# Patient Record
Sex: FEMALE | Race: WHITE | Employment: OTHER | URBAN - METROPOLITAN AREA
[De-identification: names, ages, dates, MRNs, and addresses within clinical notes are randomized per-mention and may not be internally consistent; named-entity substitution may affect disease eponyms.]

---

## 2024-03-13 ENCOUNTER — APPOINTMENT (OUTPATIENT)
Dept: CT IMAGING | Facility: HOSPITAL | Age: 79
End: 2024-03-13
Attending: EMERGENCY MEDICINE
Payer: MEDICARE

## 2024-03-13 ENCOUNTER — HOSPITAL ENCOUNTER (INPATIENT)
Facility: HOSPITAL | Age: 79
LOS: 7 days | Discharge: HOME | End: 2024-03-20
Attending: EMERGENCY MEDICINE | Admitting: HOSPITALIST
Payer: MEDICARE

## 2024-03-13 ENCOUNTER — APPOINTMENT (OUTPATIENT)
Dept: GENERAL RADIOLOGY | Facility: HOSPITAL | Age: 79
End: 2024-03-13
Attending: EMERGENCY MEDICINE
Payer: MEDICARE

## 2024-03-13 ENCOUNTER — APPOINTMENT (OUTPATIENT)
Dept: GENERAL RADIOLOGY | Facility: HOSPITAL | Age: 79
End: 2024-03-13
Attending: INTERNAL MEDICINE
Payer: MEDICARE

## 2024-03-13 DIAGNOSIS — I50.9 CONGESTIVE HEART FAILURE, UNSPECIFIED HF CHRONICITY, UNSPECIFIED HEART FAILURE TYPE (HCC): ICD-10-CM

## 2024-03-13 DIAGNOSIS — E87.1 HYPONATREMIA: ICD-10-CM

## 2024-03-13 DIAGNOSIS — J11.1 INFLUENZA: ICD-10-CM

## 2024-03-13 DIAGNOSIS — J44.1 COPD EXACERBATION (HCC): Primary | ICD-10-CM

## 2024-03-13 DIAGNOSIS — R09.02 HYPOXIA: ICD-10-CM

## 2024-03-13 DIAGNOSIS — E66.01 MORBID OBESITY (HCC): ICD-10-CM

## 2024-03-13 PROBLEM — E87.3 METABOLIC ALKALOSIS: Status: ACTIVE | Noted: 2024-03-13

## 2024-03-13 LAB
ALBUMIN SERPL-MCNC: 3.7 G/DL (ref 3.4–5)
ALBUMIN/GLOB SERPL: 1.1 {RATIO} (ref 1–2)
ALP LIVER SERPL-CCNC: 111 U/L
ALT SERPL-CCNC: 29 U/L
ANION GAP SERPL CALC-SCNC: 6 MMOL/L (ref 0–18)
ARTERIAL PATENCY WRIST A: POSITIVE
AST SERPL-CCNC: 16 U/L (ref 15–37)
BASE EXCESS BLDA CALC-SCNC: -0.6 MMOL/L (ref ?–2)
BASE EXCESS BLDA CALC-SCNC: -2.5 MMOL/L (ref ?–2)
BASE EXCESS BLDA CALC-SCNC: -4.5 MMOL/L (ref ?–2)
BASOPHILS # BLD AUTO: 0.08 X10(3) UL (ref 0–0.2)
BASOPHILS NFR BLD AUTO: 0.9 %
BILIRUB SERPL-MCNC: 0.6 MG/DL (ref 0.1–2)
BODY TEMPERATURE: 98.6 F
BUN BLD-MCNC: 13 MG/DL (ref 9–23)
CA-I BLD-SCNC: 1.16 MMOL/L (ref 0.95–1.32)
CA-I BLD-SCNC: 1.19 MMOL/L (ref 0.95–1.32)
CA-I BLD-SCNC: 1.22 MMOL/L (ref 0.95–1.32)
CALCIUM BLD-MCNC: 9.7 MG/DL (ref 8.5–10.1)
CHLORIDE SERPL-SCNC: 102 MMOL/L (ref 98–112)
CO2 SERPL-SCNC: 24 MMOL/L (ref 21–32)
COHGB MFR BLD: 1 % SAT (ref 0–3)
COHGB MFR BLD: 1.2 % SAT (ref 0–3)
COHGB MFR BLD: 1.2 % SAT (ref 0–3)
CREAT BLD-MCNC: 0.88 MG/DL
D DIMER PPP FEU-MCNC: 0.86 UG/ML FEU (ref ?–0.77)
EGFRCR SERPLBLD CKD-EPI 2021: 68 ML/MIN/1.73M2 (ref 60–?)
EOSINOPHIL # BLD AUTO: 0.21 X10(3) UL (ref 0–0.7)
EOSINOPHIL NFR BLD AUTO: 2.3 %
ERYTHROCYTE [DISTWIDTH] IN BLOOD BY AUTOMATED COUNT: 15.6 %
EXPIRATORY PRESSURE: 6 CM H2O
FIO2: 45 %
FIO2: 80 %
FLUAV + FLUBV RNA SPEC NAA+PROBE: NEGATIVE
FLUAV + FLUBV RNA SPEC NAA+PROBE: POSITIVE
GLOBULIN PLAS-MCNC: 3.4 G/DL (ref 2.8–4.4)
GLUCOSE BLD-MCNC: 130 MG/DL (ref 70–99)
GLUCOSE BLD-MCNC: 156 MG/DL (ref 70–99)
HCO3 BLDA-SCNC: 21.4 MEQ/L (ref 21–27)
HCO3 BLDA-SCNC: 23 MEQ/L (ref 21–27)
HCO3 BLDA-SCNC: 24.5 MEQ/L (ref 21–27)
HCT VFR BLD AUTO: 42.2 %
HGB BLD-MCNC: 13.4 G/DL
HGB BLD-MCNC: 13.6 G/DL
HGB BLD-MCNC: 13.7 G/DL
HGB BLD-MCNC: 13.8 G/DL
IMM GRANULOCYTES # BLD AUTO: 0.07 X10(3) UL (ref 0–1)
IMM GRANULOCYTES NFR BLD: 0.8 %
INSP PRESSURE: 14 CM H2O
L/M: 12 L/MIN
LACTATE BLD-SCNC: 1.7 MMOL/L (ref 0.5–2)
LACTATE BLD-SCNC: 2.5 MMOL/L (ref 0.5–2)
LACTATE BLD-SCNC: 2.8 MMOL/L (ref 0.5–2)
LACTATE SERPL-SCNC: 1.8 MMOL/L (ref 0.4–2)
LYMPHOCYTES # BLD AUTO: 0.54 X10(3) UL (ref 1–4)
LYMPHOCYTES NFR BLD AUTO: 5.8 %
MCH RBC QN AUTO: 32.1 PG (ref 26–34)
MCHC RBC AUTO-ENTMCNC: 31.8 G/DL (ref 31–37)
MCV RBC AUTO: 101 FL
METHGB MFR BLD: 0 % SAT (ref 0.4–1.5)
METHGB MFR BLD: 0.3 % SAT (ref 0.4–1.5)
METHGB MFR BLD: 0.4 % SAT (ref 0.4–1.5)
MONOCYTES # BLD AUTO: 0.67 X10(3) UL (ref 0.1–1)
MONOCYTES NFR BLD AUTO: 7.2 %
NEUTROPHILS # BLD AUTO: 7.7 X10 (3) UL (ref 1.5–7.7)
NEUTROPHILS # BLD AUTO: 7.7 X10(3) UL (ref 1.5–7.7)
NEUTROPHILS NFR BLD AUTO: 83 %
NT-PROBNP SERPL-MCNC: 727 PG/ML (ref ?–450)
OSMOLALITY SERPL CALC.SUM OF ELEC: 276 MOSM/KG (ref 275–295)
OXYHGB MFR BLDA: 97 % (ref 92–100)
OXYHGB MFR BLDA: 97.7 % (ref 92–100)
OXYHGB MFR BLDA: 97.9 % (ref 92–100)
P/F RATIO: 209 MMHG
PCO2 BLDA: 35 MM HG (ref 35–45)
PCO2 BLDA: 35 MM HG (ref 35–45)
PCO2 BLDA: 43 MM HG (ref 35–45)
PEEP: 5 CM H2O
PH BLDA: 7.31 [PH] (ref 7.35–7.45)
PH BLDA: 7.4 [PH] (ref 7.35–7.45)
PH BLDA: 7.43 [PH] (ref 7.35–7.45)
PLATELET # BLD AUTO: 259 10(3)UL (ref 150–450)
PO2 BLDA: 117 MM HG (ref 80–100)
PO2 BLDA: 140 MM HG (ref 80–100)
PO2 BLDA: 94 MM HG (ref 80–100)
POTASSIUM BLD-SCNC: 4 MMOL/L (ref 3.6–5.1)
POTASSIUM BLD-SCNC: 4.3 MMOL/L (ref 3.6–5.1)
POTASSIUM BLD-SCNC: 4.4 MMOL/L (ref 3.6–5.1)
POTASSIUM SERPL-SCNC: 3.9 MMOL/L (ref 3.5–5.1)
PROT SERPL-MCNC: 7.1 G/DL (ref 6.4–8.2)
RBC # BLD AUTO: 4.18 X10(6)UL
RSV RNA SPEC NAA+PROBE: NEGATIVE
SARS-COV-2 RNA RESP QL NAA+PROBE: NOT DETECTED
SODIUM BLD-SCNC: 128 MMOL/L (ref 135–145)
SODIUM BLD-SCNC: 129 MMOL/L (ref 135–145)
SODIUM BLD-SCNC: 130 MMOL/L (ref 135–145)
SODIUM SERPL-SCNC: 132 MMOL/L (ref 136–145)
TIDAL VOLUME: 450 ML
TROPONIN I SERPL HS-MCNC: 10 NG/L
VENT RATE: 16 /MIN
VENT RATE: 16 /MIN
WBC # BLD AUTO: 9.3 X10(3) UL (ref 4–11)

## 2024-03-13 PROCEDURE — 3E033XZ INTRODUCTION OF VASOPRESSOR INTO PERIPHERAL VEIN, PERCUTANEOUS APPROACH: ICD-10-PCS | Performed by: EMERGENCY MEDICINE

## 2024-03-13 PROCEDURE — 71045 X-RAY EXAM CHEST 1 VIEW: CPT | Performed by: INTERNAL MEDICINE

## 2024-03-13 PROCEDURE — 71275 CT ANGIOGRAPHY CHEST: CPT | Performed by: EMERGENCY MEDICINE

## 2024-03-13 PROCEDURE — 71045 X-RAY EXAM CHEST 1 VIEW: CPT | Performed by: EMERGENCY MEDICINE

## 2024-03-13 PROCEDURE — 99223 1ST HOSP IP/OBS HIGH 75: CPT | Performed by: HOSPITALIST

## 2024-03-13 PROCEDURE — 5A09357 ASSISTANCE WITH RESPIRATORY VENTILATION, LESS THAN 24 CONSECUTIVE HOURS, CONTINUOUS POSITIVE AIRWAY PRESSURE: ICD-10-PCS | Performed by: EMERGENCY MEDICINE

## 2024-03-13 PROCEDURE — 5A1935Z RESPIRATORY VENTILATION, LESS THAN 24 CONSECUTIVE HOURS: ICD-10-PCS | Performed by: EMERGENCY MEDICINE

## 2024-03-13 PROCEDURE — 0BH17EZ INSERTION OF ENDOTRACHEAL AIRWAY INTO TRACHEA, VIA NATURAL OR ARTIFICIAL OPENING: ICD-10-PCS | Performed by: EMERGENCY MEDICINE

## 2024-03-13 PROCEDURE — 70450 CT HEAD/BRAIN W/O DYE: CPT | Performed by: EMERGENCY MEDICINE

## 2024-03-13 RX ORDER — ACETAMINOPHEN 325 MG/1
650 TABLET ORAL EVERY 4 HOURS PRN
Status: DISCONTINUED | OUTPATIENT
Start: 2024-03-13 | End: 2024-03-14

## 2024-03-13 RX ORDER — COLCHICINE 0.6 MG/1
0.6 TABLET ORAL DAILY
COMMUNITY
End: 2024-03-20

## 2024-03-13 RX ORDER — LEVOTHYROXINE SODIUM 88 UG/1
88 TABLET ORAL
Status: DISCONTINUED | OUTPATIENT
Start: 2024-03-14 | End: 2024-03-20

## 2024-03-13 RX ORDER — METHYLPREDNISOLONE SODIUM SUCCINATE 125 MG/2ML
60 INJECTION, POWDER, LYOPHILIZED, FOR SOLUTION INTRAMUSCULAR; INTRAVENOUS EVERY 8 HOURS
Status: DISCONTINUED | OUTPATIENT
Start: 2024-03-13 | End: 2024-03-13

## 2024-03-13 RX ORDER — MELATONIN
325
COMMUNITY
End: 2024-03-20

## 2024-03-13 RX ORDER — ACETAMINOPHEN 10 MG/ML
1000 INJECTION, SOLUTION INTRAVENOUS EVERY 6 HOURS PRN
Status: DISCONTINUED | OUTPATIENT
Start: 2024-03-13 | End: 2024-03-14

## 2024-03-13 RX ORDER — ETOMIDATE 2 MG/ML
INJECTION INTRAVENOUS
Status: COMPLETED | OUTPATIENT
Start: 2024-03-13 | End: 2024-03-13

## 2024-03-13 RX ORDER — ONDANSETRON 2 MG/ML
4 INJECTION INTRAMUSCULAR; INTRAVENOUS ONCE
Status: COMPLETED | OUTPATIENT
Start: 2024-03-13 | End: 2024-03-13

## 2024-03-13 RX ORDER — LEVETIRACETAM 500 MG/1
500 TABLET, FILM COATED, EXTENDED RELEASE ORAL DAILY
COMMUNITY
End: 2024-03-20

## 2024-03-13 RX ORDER — VALSARTAN 160 MG/1
160 TABLET ORAL DAILY
COMMUNITY
End: 2024-03-20

## 2024-03-13 RX ORDER — FUROSEMIDE 10 MG/ML
40 INJECTION INTRAMUSCULAR; INTRAVENOUS ONCE
Status: COMPLETED | OUTPATIENT
Start: 2024-03-13 | End: 2024-03-13

## 2024-03-13 RX ORDER — LEFLUNOMIDE 10 MG/1
10 TABLET ORAL DAILY
COMMUNITY
End: 2024-03-20

## 2024-03-13 RX ORDER — BUPROPION HYDROCHLORIDE 300 MG/1
300 TABLET ORAL DAILY
COMMUNITY
End: 2024-03-20

## 2024-03-13 RX ORDER — ETOMIDATE 2 MG/ML
0.3 INJECTION INTRAVENOUS ONCE
Status: DISCONTINUED | OUTPATIENT
Start: 2024-03-13 | End: 2024-03-14 | Stop reason: ALTCHOICE

## 2024-03-13 RX ORDER — METOPROLOL SUCCINATE 200 MG/1
200 TABLET, EXTENDED RELEASE ORAL DAILY
COMMUNITY
End: 2024-03-20

## 2024-03-13 RX ORDER — ACETAMINOPHEN 650 MG/1
650 SUPPOSITORY RECTAL ONCE
Status: COMPLETED | OUTPATIENT
Start: 2024-03-13 | End: 2024-03-13

## 2024-03-13 RX ORDER — DIAZEPAM 5 MG/ML
2.5 INJECTION, SOLUTION INTRAMUSCULAR; INTRAVENOUS ONCE
Status: COMPLETED | OUTPATIENT
Start: 2024-03-13 | End: 2024-03-13

## 2024-03-13 RX ORDER — METHYLPREDNISOLONE SODIUM SUCCINATE 125 MG/2ML
60 INJECTION, POWDER, LYOPHILIZED, FOR SOLUTION INTRAMUSCULAR; INTRAVENOUS EVERY 8 HOURS
Status: DISCONTINUED | OUTPATIENT
Start: 2024-03-13 | End: 2024-03-15

## 2024-03-13 RX ORDER — CHLORHEXIDINE GLUCONATE ORAL RINSE 1.2 MG/ML
15 SOLUTION DENTAL
Status: DISCONTINUED | OUTPATIENT
Start: 2024-03-13 | End: 2024-03-14

## 2024-03-13 RX ORDER — MIRABEGRON 50 MG/1
50 TABLET, FILM COATED, EXTENDED RELEASE ORAL DAILY
COMMUNITY
End: 2024-03-20

## 2024-03-13 RX ORDER — FESOTERODINE FUMARATE 4 MG/1
4 TABLET, EXTENDED RELEASE ORAL DAILY
COMMUNITY
End: 2024-03-20

## 2024-03-13 RX ORDER — CLONAZEPAM 0.5 MG/1
0.5 TABLET ORAL DAILY
COMMUNITY
End: 2024-03-20

## 2024-03-13 RX ORDER — LEVOTHYROXINE SODIUM 88 UG/1
88 TABLET ORAL
COMMUNITY

## 2024-03-13 RX ORDER — PANTOPRAZOLE SODIUM 40 MG/1
40 TABLET, DELAYED RELEASE ORAL
COMMUNITY
End: 2024-03-20

## 2024-03-13 RX ORDER — METHYLPREDNISOLONE SODIUM SUCCINATE 125 MG/2ML
80 INJECTION, POWDER, LYOPHILIZED, FOR SOLUTION INTRAMUSCULAR; INTRAVENOUS ONCE
Status: COMPLETED | OUTPATIENT
Start: 2024-03-13 | End: 2024-03-13

## 2024-03-13 RX ORDER — DEXMEDETOMIDINE HYDROCHLORIDE 4 UG/ML
INJECTION, SOLUTION INTRAVENOUS CONTINUOUS
Status: DISCONTINUED | OUTPATIENT
Start: 2024-03-13 | End: 2024-03-14

## 2024-03-13 RX ORDER — HYDRALAZINE HYDROCHLORIDE 20 MG/ML
20 INJECTION INTRAMUSCULAR; INTRAVENOUS ONCE
Status: COMPLETED | OUTPATIENT
Start: 2024-03-13 | End: 2024-03-13

## 2024-03-13 RX ORDER — POLYETHYLENE GLYCOL 3350 17 G/17G
17 POWDER, FOR SOLUTION ORAL DAILY PRN
Status: DISCONTINUED | OUTPATIENT
Start: 2024-03-13 | End: 2024-03-14

## 2024-03-13 RX ORDER — HYDROXYCHLOROQUINE SULFATE 200 MG/1
200 TABLET, FILM COATED ORAL 2 TIMES DAILY
COMMUNITY
End: 2024-03-20

## 2024-03-13 RX ORDER — ENOXAPARIN SODIUM 100 MG/ML
40 INJECTION SUBCUTANEOUS DAILY
Status: DISCONTINUED | OUTPATIENT
Start: 2024-03-13 | End: 2024-03-16

## 2024-03-13 RX ORDER — FAMOTIDINE 10 MG/ML
20 INJECTION, SOLUTION INTRAVENOUS 2 TIMES DAILY
Status: DISCONTINUED | OUTPATIENT
Start: 2024-03-13 | End: 2024-03-13 | Stop reason: SDUPTHER

## 2024-03-13 RX ORDER — ENEMA 19; 7 G/133ML; G/133ML
1 ENEMA RECTAL ONCE AS NEEDED
Status: DISCONTINUED | OUTPATIENT
Start: 2024-03-13 | End: 2024-03-14

## 2024-03-13 RX ORDER — LEVETIRACETAM 100 MG/ML
500 SOLUTION ORAL 2 TIMES DAILY
Status: DISCONTINUED | OUTPATIENT
Start: 2024-03-13 | End: 2024-03-14

## 2024-03-13 RX ORDER — BISACODYL 10 MG
10 SUPPOSITORY, RECTAL RECTAL
Status: DISCONTINUED | OUTPATIENT
Start: 2024-03-13 | End: 2024-03-14

## 2024-03-13 RX ORDER — ACETAMINOPHEN 650 MG/1
650 SUPPOSITORY RECTAL EVERY 4 HOURS PRN
Status: DISCONTINUED | OUTPATIENT
Start: 2024-03-13 | End: 2024-03-14

## 2024-03-13 RX ORDER — ACETAMINOPHEN 160 MG/5ML
650 SOLUTION ORAL EVERY 4 HOURS PRN
Status: DISCONTINUED | OUTPATIENT
Start: 2024-03-13 | End: 2024-03-14

## 2024-03-13 RX ORDER — ZAFIRLUKAST 20 MG/1
20 TABLET, FILM COATED ORAL DAILY
COMMUNITY

## 2024-03-13 RX ORDER — METHOTREXATE 2.5 MG/1
2.5 TABLET ORAL
COMMUNITY
End: 2024-03-20

## 2024-03-13 RX ORDER — PANTOPRAZOLE SODIUM 40 MG/1
40 TABLET, DELAYED RELEASE ORAL
Status: DISCONTINUED | OUTPATIENT
Start: 2024-03-13 | End: 2024-03-20

## 2024-03-13 RX ORDER — PRAMIPEXOLE DIHYDROCHLORIDE 0.12 MG/1
0.12 TABLET ORAL
COMMUNITY

## 2024-03-13 RX ORDER — LEVETIRACETAM 500 MG/1
500 TABLET ORAL 2 TIMES DAILY
Status: DISCONTINUED | OUTPATIENT
Start: 2024-03-13 | End: 2024-03-13 | Stop reason: SDUPTHER

## 2024-03-13 RX ORDER — KETOROLAC TROMETHAMINE 15 MG/ML
15 INJECTION, SOLUTION INTRAMUSCULAR; INTRAVENOUS EVERY 6 HOURS PRN
Status: DISCONTINUED | OUTPATIENT
Start: 2024-03-13 | End: 2024-03-14

## 2024-03-13 RX ORDER — OSELTAMIVIR PHOSPHATE 30 MG/1
30 CAPSULE ORAL ONCE
Status: DISCONTINUED | OUTPATIENT
Start: 2024-03-13 | End: 2024-03-14

## 2024-03-13 RX ORDER — OSELTAMIVIR PHOSPHATE 6 MG/ML
30 FOR SUSPENSION ORAL EVERY 12 HOURS SCHEDULED
Qty: 50 ML | Refills: 0 | Status: COMPLETED | OUTPATIENT
Start: 2024-03-13 | End: 2024-03-18

## 2024-03-13 RX ORDER — METOCLOPRAMIDE HYDROCHLORIDE 5 MG/ML
10 INJECTION INTRAMUSCULAR; INTRAVENOUS EVERY 8 HOURS PRN
Status: DISCONTINUED | OUTPATIENT
Start: 2024-03-13 | End: 2024-03-16

## 2024-03-13 RX ORDER — SENNOSIDES 8.8 MG/5ML
10 LIQUID ORAL NIGHTLY PRN
Status: DISCONTINUED | OUTPATIENT
Start: 2024-03-13 | End: 2024-03-14

## 2024-03-13 RX ORDER — DEXMEDETOMIDINE HYDROCHLORIDE 4 UG/ML
INJECTION, SOLUTION INTRAVENOUS
Status: COMPLETED
Start: 2024-03-13 | End: 2024-03-13

## 2024-03-13 RX ORDER — IPRATROPIUM BROMIDE AND ALBUTEROL SULFATE 2.5; .5 MG/3ML; MG/3ML
3 SOLUTION RESPIRATORY (INHALATION) ONCE
Status: COMPLETED | OUTPATIENT
Start: 2024-03-13 | End: 2024-03-13

## 2024-03-13 RX ORDER — PANTOPRAZOLE SODIUM 40 MG/1
40 TABLET, DELAYED RELEASE ORAL
Status: DISCONTINUED | OUTPATIENT
Start: 2024-03-14 | End: 2024-03-13 | Stop reason: SDUPTHER

## 2024-03-13 RX ORDER — ONDANSETRON 2 MG/ML
4 INJECTION INTRAMUSCULAR; INTRAVENOUS EVERY 6 HOURS PRN
Status: DISCONTINUED | OUTPATIENT
Start: 2024-03-13 | End: 2024-03-20

## 2024-03-13 NOTE — ED QUICK NOTES
In CT pt vomitted in Bipap for the second time; pt suctioned at CT and when returned to room; pt is awake w/speech but gag reflex is absent and pt has increased lethargy; MD updated, RT at bedside.

## 2024-03-13 NOTE — SPIRITUAL CARE NOTE
Spiritual Care Visit Note    Patient Name: Ella Berry Date of Spiritual Care Visit: 24   : 1945 Primary Dx: COPD exacerbation (HCC)       Referred By: Referral From: Nurse    Spiritual Care Taxonomy:    Intended Effects: Build relationship of care and support    Methods: Collaborate with care team member;Encourage story-telling    Interventions: Acknowledge response to difficult experience;Active listening;Ask guided questions;Silent prayer    Visit Type/Summary:     - Spiritual Care: Responded to a request via the on call phone Consulted with RN prior to visit. Offered empathic listening and emotional support. Provided information regarding how to contact Spiritual Care and left a Spiritual Care information card.    Spiritual Care support can be requested via an Epic consult. For urgent/immediate needs, please contact the On Call  at: [place]: ext [phone]    Rev. Flakita Singh MDiv

## 2024-03-13 NOTE — ED QUICK NOTES
Report received, care assumed. RN notified the pt will be intubated. Charge nurse notified. RSI and advanced airway cart to the bedside. Pharmacy ED and respiratory ED at the bedside

## 2024-03-13 NOTE — CONSULTS
[facility]    Ella Berry Patient Status:  Emergency    1945 MRN [id number]   Location [facility] EMERGENCY DEPARTMENT Attending Kaylah Saucedo MD   Hosp Day # 0 PCP None Pcp     Date of Admission: 3/13/2024  Admission Diagnosis: No admission diagnoses are documented for this encounter.     History of Present Illness: 80 y/o with h/o COPD, obesity, HTN and previous resp failure, resides in [place] and was traveling through [place], developed worsening resp distress over the last 24hrs.  Pt worsened despite use of home neb and came to ED where she was found to be in resp distress.  Pt placed on BIPAP for increased WOB.  Went to CTA to r/o PE bc of elevated ddimer and while there vomited, worsening her condition. Pt also noted to be + for influenza.  Upon my arrival, pt is in distress with RR in 40's and some confusion.  D/w  at bedside and agree that at this point, pt needs intubation for full support.  - prior to admission, no n/v/d/dysuria/hemoptysis.     Past Medical History:   Diagnosis Date    COPD (chronic obstructive pulmonary disease) (HCC)     Essential hypertension     Left bundle branch block     Metabolic encephalopathy     Pneumonia       History reviewed. No pertinent surgical history.      Allergies   Allergen Reactions    Erythromycin UNKNOWN        Social History:   reports that she has never smoked. She has never been exposed to tobacco smoke. She has never used smokeless tobacco. She reports that she does not drink alcohol and does not use drugs.      Family History:  History reviewed. No pertinent family history.      Home Medications:  Outpatient Medications Marked as Taking for the 3/13/24 encounter (Hospital Encounter)   Medication Sig Dispense Refill    ferrous sulfate 325 (65 FE) MG Oral Tab EC Take 1 tablet (325 mg total) by mouth daily with breakfast.      valsartan 160 MG Oral Tab Take 1 tablet (160 mg total) by mouth daily.      colchicine 0.6 MG Oral Tab  Take 1 tablet (0.6 mg total) by mouth daily.      pramipexole 0.125 MG Oral Tab Take 1 tablet (0.125 mg total) by mouth 5 (five) times daily.      Metoprolol Succinate  MG Oral Tablet 24 Hr Take 1 tablet (200 mg total) by mouth daily.      Mirabegron ER (MYRBETRIQ) 50 MG Oral Tablet 24 Hr Take 1 tablet (50 mg total) by mouth daily.      clonazePAM 0.5 MG Oral Tab Take 1 tablet (0.5 mg total) by mouth daily.      levETIRAcetam  MG Oral Tablet 24 Hr Take 1 tablet (500 mg total) by mouth daily.      levothyroxine 88 MCG Oral Tab Take 1 tablet (88 mcg total) by mouth before breakfast.      pantoprazole 40 MG Oral Tab EC Take 1 tablet (40 mg total) by mouth every morning before breakfast.      methotrexate 2.5 MG Oral Tab Take 1 tablet (2.5 mg total) by mouth twice a week. Take 5 tablets by mouth on Saturday and Sunday      leflunomide 10 MG Oral Tab Take 1 tablet (10 mg total) by mouth daily.          Current Medications:    Current Facility-Administered Medications:     furosemide (Lasix) 10 mg/mL injection 40 mg, 40 mg, Intravenous, Once    methylPREDNISolone sodium succinate (Solu-MEDROL) injection 80 mg, 80 mg, Intravenous, Once    oseltamivir (Tamiflu) cap 30 mg, 30 mg, Oral, Once    ipratropium-albuterol (Duoneb) 0.5-2.5 (3) MG/3ML inhalation solution 3 mL, 3 mL, Nebulization, Once     REVIEW OF SYSTEMS:   As listed in HPI, otherwise ten point ROS is negative.     OBJECTIVE:  BP (!) 163/91   Pulse 107   Temp 99.9 °F (37.7 °C) (Temporal)   Resp (!) 48   Ht 154.9 cm (5' 1\")   Wt 160 lb (72.6 kg)   SpO2 94%   BMI 30.23 kg/m²      On BIPAP      Wt Readings from Last 3 Encounters:   03/13/24 160 lb (72.6 kg)        No intake/output data recorded.  No intake/output data recorded.     General appearance: appears stated age, severe distress, moderately obese, and on BPAP  Head: Normocephalic, without obvious abnormality, atraumatic  Neck: no adenopathy, no carotid bruit, and supple, symmetrical, trachea  midline  Back: symmetric, no curvature. ROM normal. No CVA tenderness.  Lungs: rhonchi bilaterally  Heart: regular rate and rhythm, tachy  Abdomen: soft, non-tender; bowel sounds normal; no masses,  no organomegaly  Extremities: edema +1-2  Pulses: 2+ and symmetric  Skin: Skin color, texture, turgor normal. No rashes or lesions     Lab Results   Component Value Date    WBC 9.3 03/13/2024    RBC 4.18 03/13/2024    HGB 13.4 03/13/2024    HCT 42.2 03/13/2024    .0 03/13/2024    MCH 32.1 03/13/2024    MCHC 31.8 03/13/2024    RDW 15.6 03/13/2024    .0 03/13/2024     Lab Results   Component Value Date     03/13/2024    K 3.9 03/13/2024     03/13/2024    CO2 24.0 03/13/2024    BUN 13 03/13/2024    CREATSERUM 0.88 03/13/2024     03/13/2024    CA 9.7 03/13/2024    ALKPHO 111 03/13/2024    ALT 29 03/13/2024    AST 16 03/13/2024    BILT 0.6 03/13/2024    ALB 3.7 03/13/2024    TP 7.1 03/13/2024     No results found for: \"PT\", \"INR\"     Recent Labs   Lab 03/13/24  1110   ABGPHT 7.43   UCYSUZ9V 35   GJOAZ0V 117*   ABGHCO3 24.5   ABGBE -0.6   TEMP 98.6   LESLIE Positive   SITE Right Radial   DEV Aerosol mask   THGB 13.6       Imaging: CXR: CMG, pulm vasc congestion and edema     ASSESSMENT/PLAN:  Acute hypoxic resp failure- secondary to AECOPD triggered by influenza and also component of pulmonary edema.  -christian Saucedo in ER- decision to intubate and provide full vent support  - precedex/fentanyl gtt for sedation  AECOPD- IV steroids, scheduled BD nebs  ID-influenza A +  - tamiflu  - add zosyn given risk of aspiration while in CT  CHF- CXR c/w pulm edema  - IV diuresis  - echo  - likely has component of PAH  YAMILET- on CPAP as OP  - intubated  F/E/N- minimize IVFs, lytes prn, TFs in 24hrs if not able to extubate  Proph- LMWH  Dispo- Full code  - pt is critically ill  - d/w     Critical Care Time: 45 minutes    Yeison Walker MD  3/13/2024  2:44 PM

## 2024-03-13 NOTE — SPIRITUAL CARE NOTE
Spiritual Care Visit Note    Patient Name: Ella Berry Date of Spiritual Care Visit: 24   : 1945 Primary Dx: COPD exacerbation (HCC)       Referred By: Referral From: Nurse    Spiritual Care Taxonomy:    Intended Effects: Build relationship of care and support    Methods: Encourage self reflection;Demonstrate acceptance    Interventions: Ask guided questions about cultural and Amish values;Prayer for healing    Visit Type/Summary:     - Spiritual Care: Offered empathic listening and emotional support. Patient and family expressed appreciation for  visit. Provided support for Patient's spiritual/Amish requests.   Patient's  Ty was glad for follow-up by spiritual care.   remains available as needed for follow up.    Spiritual Care support can be requested via an Epic consult. For urgent/immediate needs, please contact the On Call  at: [place]: ext [phone]

## 2024-03-13 NOTE — ED QUICK NOTES
Pt had an incontinent urine episode right after being roomed; pt cleaned, changed and perwick placed; MD updated; no straight cath at this time.

## 2024-03-13 NOTE — ED INITIAL ASSESSMENT (HPI)
Pt states sudden onset GREGORIO, pt wheezing and labored.  Pt denies fevers.  Pt states coughing for 1 year. Visiting from [place], drove here Saturday.  Sort sentences.

## 2024-03-13 NOTE — RESPIRATORY THERAPY NOTE
Called for patient to give 1 hour 10 mg albuterol and 0.5 mg atrovent nebulizer treatment. Patient tachypneic with 42 breaths per minute, labored, and accessory muscle use when breathing. Patient breath sounds are diminished with expiratory wheeze before treatment. Treatment begun at 1055.     ABG drawn. Results in chart.     @1125 patient had episode of desaturation with SpO2 of 80% with good pleth on 1 hour nebulizer 12 L of Oxygen.     Patient placed on BiPAP Rate: 16/IPAP: 12/EPAP: 6/ FIO2: 100%. Patient SpO2 increased to 94%. Patient tolerating well.     Breath sound after 1 hour nebulizer treatment were diminished with bilateral crackles.     @1410 called to take patient to CT. Patient vomited in BiPAP mask at CT and worried about possible aspiration from vomit.     @1450 returned to patient room in ER. Patient remains tachypneic with 45-55 breaths per minute, accessory muscle use, and labored despite being on BiPAP. Mask for BiPAP changed because patient vomited into mask at CT.     Breath sounds remain bilaterally rhonchorous and crackles.     New ABG ordered by MD. ABG drawn by RT Barrera. Results in chart.     Will resume care of patient in ICU.     Isaias Kimbrough, Yousuf, RRT

## 2024-03-13 NOTE — ED QUICK NOTES
Pt vomitted a small amount in her mask; mask and pt mouth suctioned and cleaned up; MD asked for Zofran order; zofran given

## 2024-03-13 NOTE — ED PROVIDER NOTES
Patient Seen in: [facility] Emergency Department      History     Chief Complaint   Patient presents with    Difficulty Breathing     Stated Complaint: GREGORIO, weak, 93% RA, hx of COPD, tachypneic    Subjective:   HPI    79 female has known history of COPD presents to the emergency department with increasing shortness of breath.  Patient's is having difficulty giving history secondary to her respiratory distress.  She will answer some questions but also seems to have some confusion.  Per patient's  she has had recurrent episodes related to pneumonia.  She required intubation in 2022.  She does not typically wear oxygen during the day but does do CPAP at night.  They drove to the [facility] from [facility] on Saturday.  They state that they stopped frequently and she seemed to be at her baseline.  She was having increasing respiratory issues yesterday she did use her nebulizer but then today things worsened.   is unsure if she took her medications today she did not use a nebulizer that he is aware of today she seemed to have increasing problems breathing and subsequently brought her in.  No one is ill at home.  No other acute complaints    Objective:   Past Medical History:   Diagnosis Date    COPD (chronic obstructive pulmonary disease) (HCC)     Essential hypertension     Left bundle branch block     Metabolic encephalopathy     Pneumonia               History reviewed. No pertinent surgical history.             Social History     Socioeconomic History    Marital status:    Tobacco Use    Smoking status: Never     Passive exposure: Never    Smokeless tobacco: Never   Vaping Use    Vaping Use: Never used   Substance and Sexual Activity    Alcohol use: Never    Drug use: Never              Review of Systems   Unable to perform ROS: Acuity of condition       Positive for stated complaint: GREGORIO, weak, 93% RA, hx of COPD, tachypneic  Other systems are as noted in HPI.  Constitutional  and vital signs reviewed.      All other systems reviewed and negative except as noted above.    Physical Exam     ED Triage Vitals   BP 03/13/24 1025 (!) 171/90   Pulse 03/13/24 1025 95   Resp 03/13/24 1025 (!) 28   Temp 03/13/24 1030 98.9 °F (37.2 °C)   Temp src 03/13/24 1030 Temporal   SpO2 03/13/24 1025 91 %   O2 Device 03/13/24 1025 None (Room air)       Current:/62   Pulse 96   Temp 99.9 °F (37.7 °C) (Temporal)   Resp (!) 29   Ht 154.9 cm (5' 1\")   Wt 72.6 kg   SpO2 94%   BMI 30.23 kg/m²         Physical Exam  Vitals and nursing note reviewed. Chaperone present:  in room  assisting with history.   Constitutional:       General: She is in acute distress.      Appearance: She is well-developed. She is obese. She is ill-appearing.   HENT:      Head: Normocephalic and atraumatic.   Cardiovascular:      Rate and Rhythm: Normal rate and regular rhythm.      Pulses: Normal pulses.      Heart sounds: Normal heart sounds.   Pulmonary:      Effort: Respiratory distress present.      Breath sounds: No stridor. Wheezing, rhonchi and rales present.   Abdominal:      General: Bowel sounds are normal.      Palpations: Abdomen is soft.      Tenderness: There is no abdominal tenderness. There is no rebound.   Musculoskeletal:         General: No tenderness. Normal range of motion.      Cervical back: Normal range of motion and neck supple.      Right lower leg: Edema present.      Left lower leg: Edema present.   Lymphadenopathy:      Cervical: No cervical adenopathy.   Skin:     General: Skin is warm and dry.      Coloration: Skin is not pale.   Neurological:      General: No focal deficit present.      Mental Status: She is alert. She is disoriented.      Cranial Nerves: No cranial nerve deficit.      Coordination: Coordination normal.      Comments: But not aware of time of day having difficulty giving significant history but also significant respiratory distress              ED Course     Labs Reviewed    COMP METABOLIC PANEL (14) - Abnormal; Notable for the following components:       Result Value    Glucose 130 (*)     Sodium 132 (*)     All other components within normal limits   PRO BETA NATRIURETIC PEPTIDE - Abnormal; Notable for the following components:    Pro-Beta Natriuretic Peptide 727 (*)     All other components within normal limits   D-DIMER - Abnormal; Notable for the following components:    D-Dimer 0.86 (*)     All other components within normal limits   ABG PANEL W ELECT AND LACTATE - Abnormal; Notable for the following components:    ABG pO2 117 (*)     Methemoglobin 0.3 (*)     Sodium Blood Gas 130 (*)     All other components within normal limits   ABG PANEL W ELECT AND LACTATE - Abnormal; Notable for the following components:    ABG pH 7.31 (*)     ABG pO2 140 (*)     ABG Base Excess -4.5 (*)     Sodium Blood Gas 128 (*)     Lactic Acid (Blood Gas) 2.8 (*)     All other components within normal limits   SARS-COV-2/FLU A AND B/RSV BY PCR ([email or web address]) - Abnormal; Notable for the following components:    Influenza A by PCR Positive (*)     All other components within normal limits    Narrative:     This test is intended for the qualitative detection and differentiation of SARS-CoV-2, influenza A, influenza B, and respiratory syncytial virus (RSV) viral RNA in nasopharyngeal or nares swabs from individuals suspected of respiratory viral infection consistent with COVID-19 by their healthcare provider. Signs and symptoms of respiratory viral infection due to SARS-CoV-2, influenza, and RSV can be similar.    Test performed using the Xpert Xpress SARS-CoV-2/FLU/RSV (real time RT-PCR)  assay on the [employer] instrument, [email or web address], [place] [address].   This test is being used under the Food and Drug Administration's Emergency Use Authorization.    The authorized Fact Sheet for Healthcare Providers for this assay is available upon request from the laboratory.   CBC W/ DIFFERENTIAL - Abnormal; Notable for  the following components:    .0 (*)     Lymphocyte Absolute 0.54 (*)     All other components within normal limits   TROPONIN I HIGH SENSITIVITY - Normal   LACTIC ACID, PLASMA - Normal   CBC WITH DIFFERENTIAL WITH PLATELET    Narrative:     The following orders were created for panel order CBC With Differential With Platelet.  Procedure                               Abnormality         Status                     ---------                               -----------         ------                     CBC W/ DIFFERENTIAL[[id number]]          Abnormal            Final result                 Please view results for these tests on the individual orders.   URINALYSIS WITH CULTURE REFLEX   RAINBOW DRAW LAVENDER   RAINBOW DRAW LIGHT GREEN   RAINBOW DRAW BLUE   RAINBOW DRAW LAVENDER   RAINBOW DRAW LIGHT GREEN   RAINBOW DRAW BLUE   RAINBOW DRAW GOLD   BLOOD CULTURE   BLOOD CULTURE     EKG    Rate, intervals and axes as noted on EKG Report.  Rate: 100  Rhythm: Sinus Rhythm  Reading: Left bundle branch block per  has known history of left bundle branch block                 CT ANGIOGRAPHY, CHEST (CPT=71275)    Result Date: 3/13/2024  PROCEDURE:  CT ANGIOGRAPHY, CHEST (CPT=71275)  COMPARISON:  None.  INDICATIONS:  GREGORIO, weak, 93% RA, hx of COPD, tachypneic  TECHNIQUE:  IV contrast-enhanced multislice CT angiography is performed through the pulmonary arterial anatomy. 3D volume renderings are generated.  Dose reduction techniques were used. Dose information is transmitted to the ACR (American College of Radiology) NRDR (National Radiology Data Registry) which includes the Dose Index Registry.  PATIENT STATED HISTORY:(As transcribed by Technologist)  Cough for 1 year. Flu +, respiratory distress.   CONTRAST USED:  100cc of Isovue 370  FINDINGS:   Biatrial enlargement.  No pericardial effusion.  Mild coronary calcification.  Normal caliber of the thoracic aorta with mild calcified atherosclerosis throughout its course.   No filling defects of the pulmonary arterial system to the segmental arterial level.  Nondiagnostic assessment of more peripheral pulmonary arterial branches secondary to respiratory motion artifact.  Mildly enlarged lymph nodes of the hilar and right lower paratracheal stations.  Normal esophagus.  No central airway stenosis.  Perihilar distribution of interlobular septal thickening with intervening ground-glass and patchy consolidative opacities throughout both lungs.  Findings are favored to represent pulmonary edema with interstitial and alveolar components, differential including multifocal pneumonia.  Trace bilateral pleural effusions are present with adjacent areas of passive atelectasis.  No pneumothorax.  Regional soft tissues are within normal limits.  Reflux of contrast into the intrahepatic IVC and hepatic veins indicating elevated right heart pressure.  Multilevel degenerative changes of the spine.            CONCLUSION:   1. No evidence of pulmonary embolism to the segmental arterial level.  Nondiagnostic assessment of more peripheral pulmonary arterial branches secondary to respiratory motion artifact.  2. Perihilar distribution of interlobular septal thickening and intervening ground-glass and patchy consolidative opacities.  The distribution of disease favors pulmonary edema with interstitial and alveolar components, differential including multifocal pneumonia.  Trace bilateral pleural effusions.     LOCATION:  [facility]   Dictated by (CST): Jaylon Fernandez MD on 3/13/2024 at 2:55 PM     Finalized by (CST): Jaylon Fernandez MD on 3/13/2024 at 3:18 PM       CT BRAIN OR HEAD (12980)    Result Date: 3/13/2024  PROCEDURE:  CT BRAIN OR HEAD (23721)  COMPARISON:  None.  INDICATIONS:  ams  TECHNIQUE:  Noncontrast CT scanning is performed through the brain. Dose reduction techniques were used. Dose information is transmitted to the ACR (American College of Radiology) NRDR (National Radiology Data Registry) which  includes the Dose Index Registry.  PATIENT STATED HISTORY: (As transcribed by Technologist)  Cough for 1 year. Flu +, respiratory distress.    FINDINGS:  VENTRICLES/SULCI:  Ventricles and sulci concordant with age. INTRACRANIAL:  No intracranial hemorrhage, mass effect, or acute large vessel transcortical infarct.  Gray-white differentiation is preserved. SINUSES:           Paranasal sinuses and mastoid air cells are clear. SKULL:             No evidence for fracture or osseous abnormality. OTHER:             None.            CONCLUSION:  Negative for acute intracranial process.    LOCATION:  [facility]   Dictated by (CST): Jaylon Fernandez MD on 3/13/2024 at 2:53 PM     Finalized by (CST): Jaylon Fernandez MD on 3/13/2024 at 2:55 PM       XR CHEST AP PORTABLE  (CPT=71045)    Result Date: 3/13/2024  PROCEDURE:  XR CHEST AP PORTABLE  (CPT=71045)  TECHNIQUE:  AP chest radiograph was obtained.  COMPARISON:  None.  INDICATIONS:  GREGORIO, weak, 93% RA, hx of COPD, tachypneic  PATIENT STATED HISTORY: (As transcribed by Technologist)  Patient offered no additional history at this time.    FINDINGS:  Patient is rotated and tilted towards the left.  Cardiomediastinal silhouette is within normal limits in size with atherosclerotic aortic arch.  There is pulmonary vasculature redistribution with diffuse interstitial opacities.  Kerley B lines  are noted.  Small patches of airspace opacities bilaterally.  No pleural effusion or pneumothorax.            CONCLUSION:  1. Findings suggest edema, correlate clinically with congestive heart failure.   LOCATION:  [facility]      Dictated by (CST): Allie Travis MD on 3/13/2024 at 11:35 AM     Finalized by (CST): Allie Travis MD on 3/13/2024 at 11:37 AM              MDM      Patient had IV established and blood work obtained.  She received an hour-long nebulizer treatment that was combination of albuterol and Atrovent.  She had a blood gas obtained while she was receiving the treatment and other than hypoxia she  has no evidence of hypercapnia or significant acid-base abnormality.  She had a chest x-ray performed I personally reviewed the films and she does appear to have some fluid overload.  No obvious pneumothorax or pleural effusion.  She does have a slight bump in her proBNP.  This would also be consistent with some fluid overload.  Per the  she has not had issues with congestive heart failure that he is aware of.  She has no acute ST segment changes and has a chronic left bundle.  Patient was found to be positive for influenza.  Is felt that most likely with her underlying COPD and then having influenza causing her to have some hypoxia and increased rate on her heart giving her subsequent congestive heart failure.  Currently her lactate is negative she does not have an elevated white count.  There is no clear consolidation.  She does not have an elevated lactate.  At this time do not feel that she has an acute septic picture.  Patient was given Solu-Medrol and Lasix.  She did travel here by car and she has a slight bump in her D-dimer.  Abundance of caution we did do a CTA of her chest.  CTA did not reveal any evidence of pulmonary embolism they felt that she was mostly fluid overload could not rule out potential pneumonia but in light of the influenza this seems to be most consistent.  Patient was quite anxious going to CT and was also having some nausea she was given Zofran.  And was given a small amount of Ativan.  When patient was getting her CT she had an episode of emesis and potentially aspirated.  When patient returned to the emergency department she had worsening respiratory distress.  We did suction her.  She had repeat blood gas and has slight worsening of her acidemia.  After discussion with pulmonary critical care we opted to do intubation as she was progressing despite aggressive management.  Patient was intubated after using etomidate.  Patient was intubated with a Campos 4 blade and 8-0 ET tube.   She did have some thick secretions.  After intubation she had a drop in her blood pressure.  Most likely this is related to the reduction of respiratory drive.  She was given a small fluid bolus as she is already fluid overloaded.  She did not respond significantly with a 250 cc fluid bolus and therefore was started on Levophed.    Patient did not receive 30ml/kg of fluids despite having: hypotension. The reason for doing so is: a concern for fluid overload. 30ml/kgmL of fluids were given instead (250 is the amount of fluids given).       A total of 49 minutes of critical care time (exclusive of billable procedures) was administered to manage the patient's respiratory instability due to her COPD, influenza and congestive heart failure.  This involved direct patient intervention, complex decision making, and/or extensive discussions with the patient, family, and clinical staff.        Admission disposition: 3/13/2024  2:16 PM                                        Medical Decision Making      Disposition and Plan     Clinical Impression:  1. COPD exacerbation (HCC)    2. Hypoxia    3. Influenza    4. Congestive heart failure, unspecified HF chronicity, unspecified heart failure type (HCC)    5. Morbid obesity (HCC)    6. Hyponatremia         Disposition:  Admit  3/13/2024  2:16 pm    Follow-up:  No follow-up provider specified.        Medications Prescribed:  Current Discharge Medication List                            Hospital Problems       Present on Admission  Date Reviewed: 3/13/2024            ICD-10-CM Noted POA    * (Principal) COPD exacerbation (HCC) J44.1 3/13/2024 Unknown    Metabolic alkalosis E87.3 3/13/2024 Yes

## 2024-03-13 NOTE — H&P
[facility]     Ella Berry Patient Status:  Emergency    1945 MRN [id number]   Location [facility] EMERGENCY DEPARTMENT Attending Kaylah Saucedo MD   Hosp Day # 0 PCP None Pcp     Chief Complaint: SOB    Subjective:    History of Present Illness:     Ella Berry is a 79 year old female with medical history of COPD, essential hypertension and chronic LBBB who comes to the ER with complaints of shortness pf breath.  She is visiting some friends in the area and is originally from [place].  She was out at a doctors appointment with her friend today and was noted to be short of breath and not herself so she was brought to the ER for further evaluation.  In the ER she was given an hour long neb without improvement and started on a BIPAP.  She also tested positive for influenza    History/Other:    Past Medical History:  Past Medical History:   Diagnosis Date    COPD (chronic obstructive pulmonary disease) (HCC)     Essential hypertension     Left bundle branch block     Metabolic encephalopathy     Pneumonia      Past Surgical History:   History reviewed. No pertinent surgical history.   Family History:   History reviewed. No pertinent family history.  Social History:    reports that she has never smoked. She has never been exposed to tobacco smoke. She has never used smokeless tobacco. She reports that she does not drink alcohol and does not use drugs.     Allergies:   Allergies   Allergen Reactions    Erythromycin UNKNOWN       Medications:    No current facility-administered medications on file prior to encounter.     Current Outpatient Medications on File Prior to Encounter   Medication Sig Dispense Refill    ferrous sulfate 325 (65 FE) MG Oral Tab EC Take 1 tablet (325 mg total) by mouth daily with breakfast.      valsartan 160 MG Oral Tab Take 1 tablet (160 mg total) by mouth daily.      colchicine 0.6 MG Oral Tab Take 1 tablet (0.6 mg total) by mouth daily.       pramipexole 0.125 MG Oral Tab Take 1 tablet (0.125 mg total) by mouth 5 (five) times daily.      Metoprolol Succinate  MG Oral Tablet 24 Hr Take 1 tablet (200 mg total) by mouth daily.      Mirabegron ER (MYRBETRIQ) 50 MG Oral Tablet 24 Hr Take 1 tablet (50 mg total) by mouth daily.      clonazePAM 0.5 MG Oral Tab Take 1 tablet (0.5 mg total) by mouth daily.      levETIRAcetam  MG Oral Tablet 24 Hr Take 1 tablet (500 mg total) by mouth daily.      levothyroxine 88 MCG Oral Tab Take 1 tablet (88 mcg total) by mouth before breakfast.      pantoprazole 40 MG Oral Tab EC Take 1 tablet (40 mg total) by mouth every morning before breakfast.      methotrexate 2.5 MG Oral Tab Take 1 tablet (2.5 mg total) by mouth twice a week. Take 5 tablets by mouth on Saturday and Sunday      leflunomide 10 MG Oral Tab Take 1 tablet (10 mg total) by mouth daily.         Review of Systems:   A comprehensive review of systems was completed.    Pertinent positives and negatives noted in the HPI.    Objective:   Physical Exam:    /70   Pulse 116   Temp 99.9 °F (37.7 °C) (Temporal)   Resp (!) 47   Ht 5' 1\" (1.549 m)   Wt 160 lb (72.6 kg)   SpO2 97%   BMI 30.23 kg/m²   General: No acute distress, Alert  Respiratory: bilateral wheezing+  Cardiovascular: S1, S2. Regular rate and rhythm  Abdomen: Soft, Non-tender, non-distended, positive bowel sounds  Neuro: No new focal deficits  Extremities: No edema      Results:    Labs:      Labs Last 24 Hours:    Recent Labs   Lab 03/13/24  1049   RBC 4.18   HGB 13.4   HCT 42.2   .0*   MCH 32.1   MCHC 31.8   RDW 15.6   NEPRELIM 7.70   WBC 9.3   .0       Recent Labs   Lab 03/13/24  1049   *   BUN 13   CREATSERUM 0.88   EGFRCR 68   CA 9.7   ALB 3.7   *   K 3.9      CO2 24.0   ALKPHO 111   AST 16   ALT 29   BILT 0.6   TP 7.1       No results found for: \"PT\", \"INR\"    Recent Labs   Lab 03/13/24  1049   TROPHS 10       Recent Labs   Lab 03/13/24  1049    PBNP 727*       No results for input(s): \"PCT\" in the last 168 hours.    Imaging: Imaging data reviewed in Epic.    Assessment & Plan:      #Acute respiratory failure due to influenza with underlying COPD  -on BIPAP  -steroids  -tamiflu  -nebs    #Essential hypertension  # fluid overload  - Check ECHO  -diuresis  -has elevated proBNP      Discussed code status with .  Patient is a Full code,  we talked that if her respiratory status were to worsen then intubated would be necessary, he is in agreement    Plan of care discussed with patient and     Erin Beckford MD    Supplementary Documentation:     The 21st Century Cures Act makes medical notes like these available to patients in the interest of transparency. Please be advised this is a medical document. Medical documents are intended to carry relevant information, facts as evident, and the clinical opinion of the practitioner. The medical note is intended as peer to peer communication and may appear blunt or direct. It is written in medical language and may contain abbreviations or verbiage that are unfamiliar.

## 2024-03-14 ENCOUNTER — APPOINTMENT (OUTPATIENT)
Dept: CV DIAGNOSTICS | Facility: HOSPITAL | Age: 79
End: 2024-03-14
Attending: HOSPITALIST
Payer: MEDICARE

## 2024-03-14 LAB
ANION GAP SERPL CALC-SCNC: 11 MMOL/L (ref 0–18)
ARTERIAL PATENCY WRIST A: POSITIVE
ATRIAL RATE: 100 BPM
BASE EXCESS BLDA CALC-SCNC: -0.7 MMOL/L (ref ?–2)
BASOPHILS # BLD AUTO: 0.04 X10(3) UL (ref 0–0.2)
BASOPHILS NFR BLD AUTO: 0.2 %
BILIRUB UR QL STRIP.AUTO: NEGATIVE
BODY TEMPERATURE: 98.6 F
BUN BLD-MCNC: 22 MG/DL (ref 9–23)
CA-I BLD-SCNC: 1.18 MMOL/L (ref 0.95–1.32)
CALCIUM BLD-MCNC: 9.2 MG/DL (ref 8.5–10.1)
CHLORIDE SERPL-SCNC: 103 MMOL/L (ref 98–112)
CLARITY UR REFRACT.AUTO: CLEAR
CO2 SERPL-SCNC: 17 MMOL/L (ref 21–32)
COHGB MFR BLD: 1.1 % SAT (ref 0–3)
COLOR UR AUTO: YELLOW
CREAT BLD-MCNC: 1.13 MG/DL
EGFRCR SERPLBLD CKD-EPI 2021: 50 ML/MIN/1.73M2 (ref 60–?)
EOSINOPHIL # BLD AUTO: 0 X10(3) UL (ref 0–0.7)
EOSINOPHIL NFR BLD AUTO: 0 %
ERYTHROCYTE [DISTWIDTH] IN BLOOD BY AUTOMATED COUNT: 15.8 %
FIO2: 50 %
GLUCOSE BLD-MCNC: 180 MG/DL (ref 70–99)
GLUCOSE UR STRIP.AUTO-MCNC: NORMAL MG/DL
HCO3 BLDA-SCNC: 24.4 MEQ/L (ref 21–27)
HCT VFR BLD AUTO: 40.1 %
HGB BLD-MCNC: 11.9 G/DL
HGB BLD-MCNC: 13.1 G/DL
HYALINE CASTS #/AREA URNS AUTO: PRESENT /LPF
IMM GRANULOCYTES # BLD AUTO: 0.11 X10(3) UL (ref 0–1)
IMM GRANULOCYTES NFR BLD: 0.6 %
KETONES UR STRIP.AUTO-MCNC: NEGATIVE MG/DL
LACTATE BLD-SCNC: 3 MMOL/L (ref 0.5–2)
LEUKOCYTE ESTERASE UR QL STRIP.AUTO: NEGATIVE
LYMPHOCYTES # BLD AUTO: 0.49 X10(3) UL (ref 1–4)
LYMPHOCYTES NFR BLD AUTO: 2.7 %
MCH RBC QN AUTO: 32.3 PG (ref 26–34)
MCHC RBC AUTO-ENTMCNC: 32.7 G/DL (ref 31–37)
MCV RBC AUTO: 98.8 FL
METHGB MFR BLD: 0 % SAT (ref 0.4–1.5)
MONOCYTES # BLD AUTO: 0.35 X10(3) UL (ref 0.1–1)
MONOCYTES NFR BLD AUTO: 1.9 %
MRSA DNA SPEC QL NAA+PROBE: NEGATIVE
NEUTROPHILS # BLD AUTO: 17.3 X10 (3) UL (ref 1.5–7.7)
NEUTROPHILS # BLD AUTO: 17.3 X10(3) UL (ref 1.5–7.7)
NEUTROPHILS NFR BLD AUTO: 94.6 %
NITRITE UR QL STRIP.AUTO: NEGATIVE
OSMOLALITY SERPL CALC.SUM OF ELEC: 280 MOSM/KG (ref 275–295)
OXYHGB MFR BLDA: 95.9 % (ref 92–100)
P AXIS: 57 DEGREES
P-R INTERVAL: 190 MS
PCO2 BLDA: 35 MM HG (ref 35–45)
PH BLDA: 7.43 [PH] (ref 7.35–7.45)
PH UR STRIP.AUTO: 5.5 [PH] (ref 5–8)
PLATELET # BLD AUTO: 221 10(3)UL (ref 150–450)
PO2 BLDA: 79 MM HG (ref 80–100)
POTASSIUM BLD-SCNC: 3.6 MMOL/L (ref 3.6–5.1)
POTASSIUM SERPL-SCNC: 4.3 MMOL/L (ref 3.5–5.1)
PRESSURE SUPPORT: 5 CM H2O
Q-T INTERVAL: 354 MS
QRS DURATION: 136 MS
QTC CALCULATION (BEZET): 456 MS
R AXIS: -60 DEGREES
RBC # BLD AUTO: 4.06 X10(6)UL
RBC #/AREA URNS AUTO: >10 /HPF
SODIUM BLD-SCNC: 131 MMOL/L (ref 135–145)
SODIUM SERPL-SCNC: 131 MMOL/L (ref 136–145)
SP GR UR STRIP.AUTO: >1.03 (ref 1–1.03)
T AXIS: 87 DEGREES
TIDAL VOLUME: 5 ML
UROBILINOGEN UR STRIP.AUTO-MCNC: NORMAL MG/DL
VENTRICULAR RATE: 100 BPM
WBC # BLD AUTO: 18.3 X10(3) UL (ref 4–11)

## 2024-03-14 PROCEDURE — 93306 TTE W/DOPPLER COMPLETE: CPT | Performed by: HOSPITALIST

## 2024-03-14 PROCEDURE — 99232 SBSQ HOSP IP/OBS MODERATE 35: CPT | Performed by: HOSPITALIST

## 2024-03-14 PROCEDURE — 99291 CRITICAL CARE FIRST HOUR: CPT | Performed by: INTERNAL MEDICINE

## 2024-03-14 RX ORDER — GABAPENTIN 600 MG/1
600 TABLET ORAL NIGHTLY
COMMUNITY

## 2024-03-14 RX ORDER — SODIUM BICARBONATE 325 MG/1
325 TABLET ORAL AS NEEDED
Status: DISCONTINUED | OUTPATIENT
Start: 2024-03-14 | End: 2024-03-14

## 2024-03-14 RX ORDER — GABAPENTIN 300 MG/1
300 CAPSULE ORAL
COMMUNITY

## 2024-03-14 RX ORDER — GABAPENTIN 600 MG/1
600 TABLET ORAL NIGHTLY
Status: DISCONTINUED | OUTPATIENT
Start: 2024-03-14 | End: 2024-03-20

## 2024-03-14 RX ORDER — PRAMIPEXOLE DIHYDROCHLORIDE 0.12 MG/1
0.12 TABLET ORAL
Status: DISCONTINUED | OUTPATIENT
Start: 2024-03-14 | End: 2024-03-20

## 2024-03-14 RX ORDER — FUROSEMIDE 10 MG/ML
20 INJECTION INTRAMUSCULAR; INTRAVENOUS
Status: COMPLETED | OUTPATIENT
Start: 2024-03-14 | End: 2024-03-15

## 2024-03-14 RX ORDER — IPRATROPIUM BROMIDE AND ALBUTEROL SULFATE 2.5; .5 MG/3ML; MG/3ML
3 SOLUTION RESPIRATORY (INHALATION)
Status: DISCONTINUED | OUTPATIENT
Start: 2024-03-14 | End: 2024-03-18

## 2024-03-14 RX ORDER — GABAPENTIN 300 MG/1
300 CAPSULE ORAL
Status: DISCONTINUED | OUTPATIENT
Start: 2024-03-14 | End: 2024-03-20

## 2024-03-14 NOTE — PROGRESS NOTES
03/14/24 1518   Vent Information   $ RT Standby Charge (per 15 min) 1   Vent Initiation Date 03/13/24   Ventilation Day(s) 2   Interface Invasive   Vent Type    Vent plugged into main power? Yes   Vent -3   Vent Mode PS   Settings   FiO2 (%) 50 %   PEEP/CPAP (cm H2O) 5 cm H20   Press Support CWP 5   Trigger Sensitivity Flow (L/min) 3 L/min   Humidification Heater   H2O Bag Level 3/4 Full   Heater Temperature 98.6 °F (37 °C)   Readings   Total RR 21   Minute Ventilation (L/min) 9 L/min   Expiratory Tidal Volume 487 mL   PIP Observed (cm H2O) 11 cm H2O   I/E Ratio 1:2.5   Alarms   High RR 40   Insp Pressure High (cm H2O) 40 cm H2O   Insp Pressure Low (cm H2O) 9 cm H2O   MV High (L/min) 20 L/min   MV Low (L/min) 3 L/min   Apnea Interval (sec) 20 seconds   Apnea Rate 16   Apnea Volume (mL) 450 mL   Spontaneous Breathing Trial   Is the FiO2 <= 0.5? (titrated for sats 92-94%) Y   Is the PEEP <= 5? Y   Is the RSBI <=104 Y   Is the patient off pressor and narcotic / sedation drips? Y   Is the patient free of ventricular arrhythmias in the past 24 hours? Y   Is the patient's cough adequate? Y   Is the patient alert (neuro), able to follow commands? Y   Daily Screen Meets All Criteria Yes   Spontaneous Parameters   Sedation holiday (date) 03/14/24   Sedation holiday (time) 1515   Spontaneous RR Rate 18   Spontaneous Minute Volume 363   Average Spontaneous Tidal Volume 363   $ Spontaneous Vital Capacity 1196   Negative Inspiratory Force -50   Total RSBI 39   Weaning Trials   Spontaneous Breathing Trial Time Initiated 1515   Spontaneous Breathing Trial Method ps   Spontaneous Breathing Trial Settings 5/5/50%   Pre Trial HR 71   Pre Trial RR 17   Pre Trial SpO2 95 %   Pre Trial /48   Pre Trial Vt 455   ETT   Placement Date/Time: 03/13/24 1519   Airway Size: 8 mm  Insertion attempts: 1  Placed By: ED physician   Secured at (cm) 22 cm   Suctioned? N   Measured From Lips   Secured Location Center   Secured by  Commercial tube whitt   Site Condition Dry   Req'd equipment at bedside Bag mask   Weaning trial initiated 5/5/50% patient doing well.

## 2024-03-14 NOTE — PROGRESS NOTES
03/14/24 1620   Vent Information   $ RT Standby Charge (per 15 min) 1   Vent Discontinued Date 03/14/24   Vent Discontinue Time 1620   Spontaneous Breathing Trial   Spontaneous Breathing Trial Complete Y   Weaning Trials   Patient self-extubated? No   Compassionate wean? No   Spontaneous Breathing Trial Duration 1 hour   Post Trial HR 73   Post Trial RR 17   Post Trial SpO2 93 %   Post Trial /49   Post Trial Vt 422     Pt extubated to a 5L NC, doing well at 94%

## 2024-03-14 NOTE — PROGRESS NOTES
[facility]    Ella Berry Patient Status:  Inpatient    1945 MRN [id number]   formerly [facility] 4SW-A Attending Erin Beckfodr MD   Hosp Day # 1 PCP None Pcp     Critical Care Progress Note     Date of Admission: 3/13/2024 10:35 AM  Admission Diagnosis: Morbid obesity (HCC) [E66.01]  Hyponatremia [E87.1]  Influenza [J11.1]  Hypoxia [R09.02]  COPD exacerbation (HCC) [J44.1]  Congestive heart failure, unspecified HF chronicity, unspecified heart failure type (HCC) [I50.9]     S:  no new events overnight.  Had transient hypotension from sedation.  On low dose levophed.      Current Medications:    Current Facility-Administered Medications:     oseltamivir (Tamiflu) cap 30 mg, 30 mg, Oral, Once    etomidate (Amidate) 2 mg/mL injection 21.8 mg, 0.3 mg/kg, Intravenous, Once    fentaNYL (Sublimaze) 50 mcg/mL injection 25 mcg, 25 mcg, Intravenous, Q30 Min PRN **OR** fentaNYL (Sublimaze) 50 mcg/mL injection 50 mcg, 50 mcg, Intravenous, Q30 Min PRN    acetaminophen (Tylenol) tab 650 mg, 650 mg, Oral, Q4H PRN **OR** acetaminophen (Tylenol) 160 MG/5ML oral liquid 650 mg, 650 mg, Oral, Q4H PRN **OR** acetaminophen (Tylenol) rectal suppository 650 mg, 650 mg, Rectal, Q4H PRN **OR** acetaminophen (Ofirmev) 10 mg/mL infusion premix 1,000 mg, 1,000 mg, Intravenous, Q6H PRN    ketorolac (Toradol) 15 MG/ML injection 15 mg, 15 mg, Intravenous, Q6H PRN    fentaNYL (Sublimaze) 25 mcg BOLUS FROM BAG infusion, 25 mcg, Intravenous, Q30 Min PRN **OR** fentaNYL (Sublimaze) 50 mcg BOLUS FROM BAG infusion, 50 mcg, Intravenous, Q30 Min PRN    fentaNYL in sodium chloride 0.9% (Sublimaze) 1000 mcg/100mL infusion premix,  mcg/hr, Intravenous, Continuous PRN    polyethylene glycol (PEG 3350) (Miralax) 17 g oral packet 17 g, 17 g, Oral, Daily PRN    senna (Senokot) 8.8 MG/5ML oral syrup 17.6 mg, 10 mL, Oral, Nightly PRN    bisacodyl (Dulcolax) 10 MG rectal suppository 10 mg, 10 mg, Rectal, Daily PRN    fleet enema  (Fleet) 7-19 GM/118ML rectal enema 133 mL, 1 enema, Rectal, Once PRN    chlorhexidine gluconate (Peridex) 0.12 % oral solution 15 mL, 15 mL, Mouth/Throat, BID@0800,2000    dexmedeTOMIDine in sodium chloride 0.9% (Precedex) 400 mcg/100mL infusion premix, 0.2-1.5 mcg/kg/hr, Intravenous, Continuous    levothyroxine (Synthroid) tab 88 mcg, 88 mcg, Oral, Before breakfast    enoxaparin (Lovenox) 40 MG/0.4ML SUBQ injection 40 mg, 40 mg, Subcutaneous, Daily    ondansetron (Zofran) 4 MG/2ML injection 4 mg, 4 mg, Intravenous, Q6H PRN    metoclopramide (Reglan) 5 mg/mL injection 10 mg, 10 mg, Intravenous, Q8H PRN    methylPREDNISolone sodium succinate (Solu-MEDROL) injection 60 mg, 60 mg, Intravenous, Q8H    oseltamivir (Tamiflu) 6 MG/ML oral suspension 30 mg, 30 mg, Oral, Q12H    piperacillin-tazobactam (Zosyn) 3.375 g in dextrose 5% 100 mL IVPB-ADDV, 3.375 g, Intravenous, Q8H    pantoprazole (Protonix) 40 mg in sodium chloride 0.9% PF 10 mL IV push, 40 mg, Intravenous, QAM AC **OR** pantoprazole (Protonix) DR tab 40 mg, 40 mg, Oral, QAM AC    vasopressin (Vasostrict) 20 Units in sodium chloride 0.9% 100 mL infusion for septic shock, 0.01-0.03 Units/min, Intravenous, Continuous    levETIRAcetam (Keppra) 100 MG/ML oral solution 500 mg, 500 mg, Oral, BID    norepinephrine (Levophed) 4 mg/250mL infusion premix, 0.5-30 mcg/min, Intravenous, Continuous     OBJECTIVE:  /55   Pulse 81   Temp 98.1 °F (36.7 °C) (Temporal)   Resp 17   Ht 154.9 cm (5' 1\")   Wt 172 lb 9.9 oz (78.3 kg)   SpO2 93%   BMI 32.62 kg/m²      Vent Mode: VC+  FiO2 (%):  [40 %-100 %] 40 %  S RR:  [16] 16  S VT:  [450 mL] 450 mL  PEEP/CPAP (cm H2O):  [5 cm H20] 5 cm H20  MAP (cm H2O):  [9-11] 9      Wt Readings from Last 3 Encounters:   03/13/24 172 lb 9.9 oz (78.3 kg)        I/O last 3 completed shifts:  In: 1127 [I.V.:827; NG/GT:100; IV PIGGYBACK:200]  Out: 1400 [Urine:1300; Emesis/NG output:100]  No intake/output data recorded.     General  appearance: alert, appears stated age, and sedated, intubated  Lungs:  coarse ant B  Heart: regular rate and rhythm  Abdomen: soft, non-tender; bowel sounds normal; no masses,  no organomegaly  Extremities: edema +1     Lab Results   Component Value Date    WBC 18.3 03/14/2024    RBC 4.06 03/14/2024    HGB 13.1 03/14/2024    HCT 40.1 03/14/2024    MCV 98.8 03/14/2024    MCH 32.3 03/14/2024    MCHC 32.7 03/14/2024    RDW 15.8 03/14/2024    .0 03/14/2024     Lab Results   Component Value Date     03/14/2024    K 4.3 03/14/2024     03/14/2024    CO2 17.0 03/14/2024    BUN 22 03/14/2024    CREATSERUM 1.13 03/14/2024     03/14/2024    CA 9.2 03/14/2024    ALKPHO 111 03/13/2024    ALT 29 03/13/2024    AST 16 03/13/2024    BILT 0.6 03/13/2024    ALB 3.7 03/13/2024    TP 7.1 03/13/2024     No results found for: \"PT\", \"INR\"        Recent Labs   Lab 03/13/24  1821   ABGPHT 7.40   VPPBMU6J 35   RZTDJ5Y 94   ABGHCO3 23.0   ABGBE -2.5*   TEMP 98.6   LESLIE Positive   SITE Left Radial   DEV    THGB 13.8       Imaging: CXR: personally reviewed.  B consolidations/air bronchograms     ASSESSMENT/PLAN:  Acute hypoxic resp failure- secondary to AECOPD triggered by influenza and also component of pulmonary edema.  - cont with full vent support  - SBT as tolerated, after echo results are reviewed.  - precedex/fentanyl gtt for sedation  AECOPD- IV steroids, scheduled BD nebs  ID-influenza A +  - tamiflu  - cont zosyn given possible aspiration while in CT and bilateral infiltrates  - check sputum cx  CHF- CXR c/w pulm edema  - IV diuresis  - echo  - likely has component of PAH  Hypotension- transient, in assn with sedation/intubation. Doubt septic shock currently  - weaned off levophed this morning  YAMILET- on CPAP as OP  - intubated  F/E/N- minimize IVFs, lytes prn, TFs within 24hrs if not able to extubate  Proph- LMWH  Dispo- Full code  - pt is critically ill  - d/w     Critical Care Time: 35  minutes    Yeison Walker MD  3/14/2024  7:14 AM

## 2024-03-14 NOTE — PROGRESS NOTES
03/13/24 2026   Vent Information   Vent Initiation Date 03/13/24   Ventilation Day(s) 1   Interface Invasive   Vent Type    Vent plugged into main power? Yes   Vent -3   Vent Mode VC+   Settings   FiO2 (%) 40 %   Resp Rate (Set) 16   Vt (Set, mL) 450 mL   Waveform Decelerating ramp   PEEP/CPAP (cm H2O) 5 cm H20   Insp Time (sec) 0.9 sec   Insp Rise Time (%) 70 %   Trigger Sensitivity Flow (L/min) 3 L/min   Humidification Heater   H2O Bag Level 3/4 Full   Heater Temperature 98.6 °F (37 °C)   Readings   Total RR 16   Minute Ventilation (L/min) 7.6 L/min   Inspiratory Tidal Volume 456 mL   Expiratory Tidal Volume 432 mL   PIP Observed (cm H2O) 19 cm H2O   MAP (cm H2O) 9.7   I/E Ratio 1:3.2   Plateau Pressure (cm H2O) 17 cm H2O   Alarms   High RR 40   Insp Pressure High (cm H2O) 40 cm H2O   Insp Pressure Low (cm H2O) 9 cm H2O   MV High (L/min) 20 L/min   MV Low (L/min) 3 L/min   Apnea Interval (sec) 16 seconds   Apnea Rate 450   ETT   Placement Date/Time: 03/13/24 1519   Airway Size: 8 mm  Insertion attempts: 1  Placed By: ED physician   Secured at (cm) 22 cm   Suctioned? Y   Measured From Lips   Secured Location Right   Secured by Commercial tube whitt   Site Condition Dry   Req'd equipment at bedside Bag mask     Resumed care of patient from ER to ICU. Before coming to ICU, patient was on BiPAP and vomited x2 and patient aspirated. Patient intubated in ER.  Patient breath sounds are crackles bilaterally. When suctioned, secretions are the color of emesis mixed with thick, yellow secretions. ABG drawn after admission to ICU. Results in chart. Patient tolerating ventilator.      at bedside.     Isaias Kimbrough, Yousuf, RR

## 2024-03-14 NOTE — PLAN OF CARE
Patient extubated @ 1620 per orders. 5L NC. Alert, oriented. Levo weaned off in AM - SBP 90-100s. Tolerating PO intake after extubation. BID lasix. Straight cath per protocol. Up to chair in evening.

## 2024-03-14 NOTE — PLAN OF CARE
GCS 10. VSS on vent, 40% FiO2. Afebrile. Low dose levo infusing to maintain SBP > 90, MAP > 65. External catheter in place. OGT to LIS. Comfort and safety maintained    Problem: Safety Risk - Non-Violent Restraints  Goal: Patient will remain free from self-harm  Description: INTERVENTIONS:  - Apply the least restrictive restraint to prevent harm  - Notify patient and family of reasons restraints applied  - Assess for any contributing factors to confusion (electrolyte disturbances, delirium, medications)  - Discontinue any unnecessary medical devices as soon as possible  - Assess the patient's physical comfort, circulation, skin condition, hydration, nutrition and elimination needs   - Reorient and redirection as needed  - Assess for the need to continue restraints  Outcome: Progressing     Problem: CARDIOVASCULAR - ADULT  Goal: Maintains optimal cardiac output and hemodynamic stability  Description: INTERVENTIONS:  - Monitor vital signs, rhythm, and trends  - Monitor for bleeding, hypotension and signs of decreased cardiac output  - Evaluate effectiveness of vasoactive medications to optimize hemodynamic stability  - Monitor arterial and/or venous puncture sites for bleeding and/or hematoma  - Assess quality of pulses, skin color and temperature  - Assess for signs of decreased coronary artery perfusion - ex. Angina  - Evaluate fluid balance, assess for edema, trend weights  Outcome: Progressing  Goal: Absence of cardiac arrhythmias or at baseline  Description: INTERVENTIONS:  - Continuous cardiac monitoring, monitor vital signs, obtain 12 lead EKG if indicated  - Evaluate effectiveness of antiarrhythmic and heart rate control medications as ordered  - Initiate emergency measures for life threatening arrhythmias  - Monitor electrolytes and administer replacement therapy as ordered  Outcome: Progressing     Problem: RESPIRATORY - ADULT  Goal: Achieves optimal ventilation and oxygenation  Description:  INTERVENTIONS:  - Assess for changes in respiratory status  - Assess for changes in mentation and behavior  - Position to facilitate oxygenation and minimize respiratory effort  - Oxygen supplementation based on oxygen saturation or ABGs  - Provide Smoking Cessation handout, if applicable  - Encourage broncho-pulmonary hygiene including cough, deep breathe, Incentive Spirometry  - Assess the need for suctioning and perform as needed  - Assess and instruct to report SOB or any respiratory difficulty  - Respiratory Therapy support as indicated  - Manage/alleviate anxiety  - Monitor for signs/symptoms of CO2 retention  Outcome: Progressing     Problem: GASTROINTESTINAL - ADULT  Goal: Minimal or absence of nausea and vomiting  Description: INTERVENTIONS:  - Maintain adequate hydration with IV or PO as ordered and tolerated  - Nasogastric tube to low intermittent suction as ordered  - Evaluate effectiveness of ordered antiemetic medications  - Provide nonpharmacologic comfort measures as appropriate  - Advance diet as tolerated, if ordered  - Obtain nutritional consult as needed  - Evaluate fluid balance  Outcome: Progressing  Goal: Maintains or returns to baseline bowel function  Description: INTERVENTIONS:  - Assess bowel function  - Maintain adequate hydration with IV or PO as ordered and tolerated  - Evaluate effectiveness of GI medications  - Encourage mobilization and activity  - Obtain nutritional consult as needed  - Establish a toileting routine/schedule  - Consider collaborating with pharmacy to review patient's medication profile  Outcome: Progressing     Problem: GENITOURINARY - ADULT  Goal: Absence of urinary retention  Description: INTERVENTIONS:  - Assess patient’s ability to void and empty bladder  - Monitor intake/output and perform bladder scan as needed  - Follow urinary retention protocol/standard of care  - Consider collaborating with pharmacy to review patient's medication profile  - Implement  strategies to promote bladder emptying  Outcome: Progressing     Problem: METABOLIC/FLUID AND ELECTROLYTES - ADULT  Goal: Glucose maintained within prescribed range  Description: INTERVENTIONS:  - Monitor Blood Glucose as ordered  - Assess for signs and symptoms of hyperglycemia and hypoglycemia  - Administer ordered medications to maintain glucose within target range  - Assess barriers to adequate nutritional intake and initiate nutrition consult as needed  - Instruct patient on self management of diabetes  Outcome: Progressing  Goal: Electrolytes maintained within normal limits  Description: INTERVENTIONS:  - Monitor labs and rhythm and assess patient for signs and symptoms of electrolyte imbalances  - Administer electrolyte replacement as ordered  - Monitor response to electrolyte replacements, including rhythm and repeat lab results as appropriate  - Fluid restriction as ordered  - Instruct patient on fluid and nutrition restrictions as appropriate  Outcome: Progressing  Goal: Hemodynamic stability and optimal renal function maintained  Description: INTERVENTIONS:  - Monitor labs and assess for signs and symptoms of volume excess or deficit  - Monitor intake, output and patient weight  - Monitor urine specific gravity, serum osmolarity and serum sodium as indicated or ordered  - Monitor response to interventions for patient's volume status, including labs, urine output, blood pressure (other measures as available)  - Encourage oral intake as appropriate  - Instruct patient on fluid and nutrition restrictions as appropriate  Outcome: Progressing     Problem: SKIN/TISSUE INTEGRITY - ADULT  Goal: Skin integrity remains intact  Description: INTERVENTIONS  - Assess and document risk factors for pressure ulcer development  - Assess and document skin integrity  - Monitor for areas of redness and/or skin breakdown  - Initiate interventions, skin care algorithm/standards of care as needed  Outcome: Progressing  Goal:  Oral mucous membranes remain intact  Description: INTERVENTIONS  - Assess oral mucosa and hygiene practices  - Implement preventative oral hygiene regimen  - Implement oral medicated treatments as ordered  Outcome: Progressing     Problem: HEMATOLOGIC - ADULT  Goal: Maintains hematologic stability  Description: INTERVENTIONS  - Assess for signs and symptoms of bleeding or hemorrhage  - Monitor labs and vital signs for trends  - Administer supportive blood products/factors, fluids and medications as ordered and appropriate  - Administer supportive blood products/factors as ordered and appropriate  Outcome: Progressing  Goal: Free from bleeding injury  Description: (Example usage: patient with low platelets)  INTERVENTIONS:  - Avoid intramuscular injections, enemas and rectal medication administration  - Ensure safe mobilization of patient  - Hold pressure on venipuncture sites to achieve adequate hemostasis  - Assess for signs and symptoms of internal bleeding  - Monitor lab trends  - Patient is to report abnormal signs of bleeding to staff  - Avoid use of toothpicks and dental floss  - Use electric shaver for shaving  - Use soft bristle tooth brush  - Limit straining and forceful nose blowing  Outcome: Progressing     Problem: PAIN - ADULT  Goal: Verbalizes/displays adequate comfort level or patient's stated pain goal  Description: INTERVENTIONS:  - Encourage pt to monitor pain and request assistance  - Assess pain using appropriate pain scale  - Administer analgesics based on type and severity of pain and evaluate response  - Implement non-pharmacological measures as appropriate and evaluate response  - Consider cultural and social influences on pain and pain management  - Manage/alleviate anxiety  - Utilize distraction and/or relaxation techniques  - Monitor for opioid side effects  - Notify MD/LIP if interventions unsuccessful or patient reports new pain  - Anticipate increased pain with activity and pre-medicate  as appropriate  Outcome: Progressing     Problem: RISK FOR INFECTION - ADULT  Goal: Absence of fever/infection during anticipated neutropenic period  Description: INTERVENTIONS  - Monitor WBC  - Administer growth factors as ordered  - Implement neutropenic guidelines  Outcome: Progressing     Problem: SAFETY ADULT - FALL  Goal: Free from fall injury  Description: INTERVENTIONS:  - Assess pt frequently for physical needs  - Identify cognitive and physical deficits and behaviors that affect risk of falls.  - [place] fall precautions as indicated by assessment.  - Educate pt/family on patient safety including physical limitations  - Instruct pt to call for assistance with activity based on assessment  - Modify environment to reduce risk of injury  - Provide assistive devices as appropriate  - Consider OT/PT consult to assist with strengthening/mobility  - Encourage toileting schedule  Outcome: Progressing

## 2024-03-14 NOTE — CM/SW NOTE
03/14/24 1600   CM/SW Referral Data   Referral Source    Reason for Referral Discharge planning   Informant Spouse/Significant Other   Patient Info   Patient's Home Environment House   Patient lives with Spouse/Significant other   Discharge Needs   Anticipated D/C needs To be determined     CM met with patient and spouse at bedside. Pt is visiting from [place] a 2 day car trip. Prior to admission pt is iADLS, driving and denies dme. 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 Plan would be to return home and soon and safe to do so. Spouse is considering flying back to [place] to shorten the traveling time but still has to sort out getting his car back to [place]. Spouse stating while visiting [place] pt ended up in the hospital.     Pt currently on vent with plan to extubate.     / to remain available for support and/or discharge planning.      Emanuel Amin, MSN RN,   [phone]

## 2024-03-14 NOTE — PROGRESS NOTES
[facility]   part of [facility]     Hospitalist Progress Note     Ella Berry Patient Status:  Inpatient    1945 MRN [id number]   Location [facility] 4SW-A Attending Erin Beckford MD   Hosp Day # 1 PCP None Pcp     Chief Complaint: shortness of breath    Subjective:     Patient intubated , awake on vent    Objective:    Review of Systems:   A comprehensive review of systems was completed; pertinent positive and negatives stated in subjective.    Vital signs:  Temp:  [97.2 °F (36.2 °C)-98.6 °F (37 °C)] 97.2 °F (36.2 °C)  Pulse:  [] 84  Resp:  [16-55] 22  BP: ()/(43-91) 94/46  SpO2:  [89 %-98 %] 92 %  FiO2 (%):  [40 %-70 %] 50 %    Physical Exam:    General: No acute distress  Respiratory: No wheezes, no rhonchi  Cardiovascular: S1, S2, regular rate and rhythm  Abdomen: Soft, Non-tender, non-distended, positive bowel sounds  Neuro: No new focal deficits.   Extremities: No edema      Diagnostic Data:    Labs:  Recent Labs   Lab 24  1049 24  0438   WBC 9.3 18.3*   HGB 13.4 13.1   .0* 98.8   .0 221.0       Recent Labs   Lab 24  1049 24  0438   * 180*   BUN 13 22   CREATSERUM 0.88 1.13*   CA 9.7 9.2   ALB 3.7  --    * 131*   K 3.9 4.3    103   CO2 24.0 17.0*   ALKPHO 111  --    AST 16  --    ALT 29  --    BILT 0.6  --    TP 7.1  --        Estimated Creatinine Clearance: 31.5 mL/min (A) (based on SCr of 1.13 mg/dL (H)).    Recent Labs   Lab 24  1049   TROPHS 10       No results for input(s): \"PTP\", \"INR\" in the last 168 hours.         Microbiology    No results found for this visit on 24.      Imaging: Reviewed in Epic.    Medications:    furosemide  20 mg Intravenous BID (Diuretic)    ipratropium-albuterol  3 mL Nebulization 6 times per day    gabapentin  300 mg Oral Noon    gabapentin  600 mg Oral Nightly    pramipexole  0.125 mg Oral 5x Daily    etomidate  0.3 mg/kg Intravenous Once    chlorhexidine gluconate   15 mL Mouth/Throat BID@0800,2000    levothyroxine  88 mcg Oral Before breakfast    enoxaparin  40 mg Subcutaneous Daily    methylPREDNISolone  60 mg Intravenous Q8H    oseltamivir  30 mg Oral Q12H    piperacillin-tazobactam  3.375 g Intravenous Q8H    pantoprazole  40 mg Intravenous QAM AC    Or    pantoprazole  40 mg Oral QAM AC       Assessment & Plan:      #Acute respiratory failure  -due to influenza with underlying COPD  -steroids  -tamiflu  -nebs    #Acute  exacerbation  -steroids  -nebs    # YAMILET    Erin Beckford MD    Supplementary Documentation:     Quality:  DVT Mechanical Prophylaxis:   SCDs,    DVT Pharmacologic Prophylaxis   Medication    enoxaparin (Lovenox) 40 MG/0.4ML SUBQ injection 40 mg                Code Status: Full Code  Wylie: External urinary catheter in place  Wylie Duration (in days):   Central line:    STORM:     Discharge is dependent on: progress  At this point Ms. Berry is expected to be discharge to: home    The 21st Century Cures Act makes medical notes like these available to patients in the interest of transparency. Please be advised this is a medical document. Medical documents are intended to carry relevant information, facts as evident, and the clinical opinion of the practitioner. The medical note is intended as peer to peer communication and may appear blunt or direct. It is written in medical language and may contain abbreviations or verbiage that are unfamiliar.             **Certification      PHYSICIAN Certification of Need for Inpatient Hospitalization - Initial Certification    Patient will require inpatient services that will reasonably be expected to span two midnight's based on the clinical documentation in H+P.   Based on patients current state of illness, I anticipate that, after discharge, patient will require TBD.

## 2024-03-14 NOTE — DIETARY NOTE
[facility]   part of [facility]  NUTRITION ASSESSMENT    Unable to diagnose malnutrition criteria at this time.    NUTRITION INTERVENTION:    Meal and Snacks - ADAT once extubated.  Enteral Nutrition - Via OGT, recommend initiating Vital AF 1.2 at 20 ml/hr and advancing 20 ml/hr q4hrs to GOAL: Vital AF at 55 ml/hr x 22hrs.   This will provide 1452 kcal, 91 grams protein, 980 ml total free water, and 97% of RDI's.   Recommend 80 ml water flush q4 hours, TF+FWF provides 1460 ml total fluids.   Hold TF 1 hour before and after administering synthroid.  Coordination of Nutrition Care - Recommend SLP consult prior to diet advancement.    PATIENT STATUS: 79 year old female admitted on 3/13 presents with COPD exacerbation triggered by influenza A. Pt is currently intubated, sedated, weaned off pressors this AM, NPO with OGT to LIS. Pt screened d/t consult for TF; discussed starting if unable to extubate. Will provide TF recs above.    PMH:  has a past medical history of COPD (chronic obstructive pulmonary disease) (HCC), Essential hypertension, Left bundle branch block, Metabolic encephalopathy, and Pneumonia.    ANTHROPOMETRICS:  Ht: 154.9 cm (5' 1\")  Wt: 78.3 kg (172 lb 9.9 oz).   BMI: Body mass index is 32.62 kg/m².  IBW: 47.7 kg    WEIGHT HISTORY:   Patient Weight(s) for the past 336 hrs:   Weight   03/13/24 2000 78.3 kg (172 lb 9.9 oz)   03/13/24 1030 72.6 kg (160 lb)       Wt Readings from Last 10 Encounters:   03/13/24 78.3 kg (172 lb 9.9 oz)        NUTRITION:  Diet:       Procedures    NPO        Percent Meals Eaten (last 3 days)       None            Food Allergies: No  Cultural/Ethnic/Jain Preferences Addressed: Yes    GI SYSTEM REVIEW: WNL  Skin/Wounds: WNL    NUTRITION RELATED PHYSICAL FINDINGS:     1. Body Fat/Muscle Mass:  MATTHEW     2. Fluid Accumulation: none per RN documentation     NUTRITION PRESCRIPTION: 78.3 kg (3/13); 47.7 kg IBW  Calories: 1501 calories/day ([facility]; MV:9.4 L/min,  Temp:36.8 C)  Protein:  grams protein/day (2-2.5 gm/kg IBW)  Fluid: ~1 ml/kcal or per MD discretion    NUTRITION DIAGNOSIS/PROBLEM:  Inadequate oral intake related to respiratory process or complication of therapy which results in mechanical ventilation as evidenced by need for NPO status    MONITOR AND EVALUATE/NUTRITION GOALS:  Weight stable within 1 to 2 lbs during admission - New  Start alternative nutrition in 24-48 hrs if diet is not able to advance- New      MEDICATIONS:  Lasix, synthroid, solumedrol, protonix, abx  Gtt: precedex    LABS:  Reviewed     Pt is at High nutrition risk    Alpa Ross RD, LDN, CNSC  Clinical Dietitian  Spectra: [phone]

## 2024-03-14 NOTE — PROGRESS NOTES
03/14/24 0455   Vent Information   $ RT Standby Charge (per 15 min) 1   Vent Initiation Date 03/13/24   Ventilation Day(s) 2   Interface Invasive   Vent Type    Vent plugged into main power? Yes   Vent -3   Vent Mode VC+   Settings   FiO2 (%) 40 %   Resp Rate (Set) 16   Vt (Set, mL) 450 mL   Waveform Decelerating ramp   PEEP/CPAP (cm H2O) 5 cm H20   Insp Time (sec) 0.9 sec   Insp Rise Time (%) 70 %   Trigger Sensitivity Flow (L/min) 3 L/min   Humidification Heater   H2O Bag Level 3/4 Full   Heater Temperature 98.6 °F (37 °C)   Readings   Total RR 19   Minute Ventilation (L/min) 8.4 L/min   Expiratory Tidal Volume 464 mL   PIP Observed (cm H2O) 17 cm H2O   MAP (cm H2O) 9   I/E Ratio 1:3.2   Alarms   High RR 40   Insp Pressure High (cm H2O) 40 cm H2O   Insp Pressure Low (cm H2O) 9 cm H2O   MV High (L/min) 20 L/min   MV Low (L/min) 3 L/min   Apnea Interval (sec) 20 seconds   Apnea Rate 16   Apnea Volume (mL) 450 mL     Pt remains intubated with current settings. Wean as dedra

## 2024-03-15 ENCOUNTER — APPOINTMENT (OUTPATIENT)
Dept: GENERAL RADIOLOGY | Facility: HOSPITAL | Age: 79
End: 2024-03-15
Attending: INTERNAL MEDICINE
Payer: MEDICARE

## 2024-03-15 LAB
ALBUMIN SERPL-MCNC: 2.8 G/DL (ref 3.4–5)
ALBUMIN/GLOB SERPL: 0.9 {RATIO} (ref 1–2)
ALP LIVER SERPL-CCNC: 108 U/L
ALT SERPL-CCNC: 471 U/L
ANION GAP SERPL CALC-SCNC: 9 MMOL/L (ref 0–18)
AST SERPL-CCNC: 404 U/L (ref 15–37)
BASOPHILS # BLD AUTO: 0.02 X10(3) UL (ref 0–0.2)
BASOPHILS NFR BLD AUTO: 0.1 %
BILIRUB SERPL-MCNC: 0.5 MG/DL (ref 0.1–2)
BUN BLD-MCNC: 31 MG/DL (ref 9–23)
CALCIUM BLD-MCNC: 8.9 MG/DL (ref 8.5–10.1)
CHLORIDE SERPL-SCNC: 100 MMOL/L (ref 98–112)
CO2 SERPL-SCNC: 23 MMOL/L (ref 21–32)
CREAT BLD-MCNC: 1.02 MG/DL
EGFRCR SERPLBLD CKD-EPI 2021: 57 ML/MIN/1.73M2 (ref 60–?)
EOSINOPHIL # BLD AUTO: 0 X10(3) UL (ref 0–0.7)
EOSINOPHIL NFR BLD AUTO: 0 %
ERYTHROCYTE [DISTWIDTH] IN BLOOD BY AUTOMATED COUNT: 16.1 %
GLOBULIN PLAS-MCNC: 3.1 G/DL (ref 2.8–4.4)
GLUCOSE BLD-MCNC: 166 MG/DL (ref 70–99)
HCT VFR BLD AUTO: 34.7 %
HGB BLD-MCNC: 11.3 G/DL
IMM GRANULOCYTES # BLD AUTO: 0.08 X10(3) UL (ref 0–1)
IMM GRANULOCYTES NFR BLD: 0.5 %
LYMPHOCYTES # BLD AUTO: 0.38 X10(3) UL (ref 1–4)
LYMPHOCYTES NFR BLD AUTO: 2.3 %
MAGNESIUM SERPL-MCNC: 2.5 MG/DL (ref 1.6–2.6)
MCH RBC QN AUTO: 31.9 PG (ref 26–34)
MCHC RBC AUTO-ENTMCNC: 32.6 G/DL (ref 31–37)
MCV RBC AUTO: 98 FL
MONOCYTES # BLD AUTO: 0.27 X10(3) UL (ref 0.1–1)
MONOCYTES NFR BLD AUTO: 1.7 %
NEUTROPHILS # BLD AUTO: 15.61 X10 (3) UL (ref 1.5–7.7)
NEUTROPHILS # BLD AUTO: 15.61 X10(3) UL (ref 1.5–7.7)
NEUTROPHILS NFR BLD AUTO: 95.4 %
OSMOLALITY SERPL CALC.SUM OF ELEC: 284 MOSM/KG (ref 275–295)
PLATELET # BLD AUTO: 188 10(3)UL (ref 150–450)
POTASSIUM SERPL-SCNC: 3.4 MMOL/L (ref 3.5–5.1)
POTASSIUM SERPL-SCNC: 4 MMOL/L (ref 3.5–5.1)
PROCALCITONIN SERPL-MCNC: 19.95 NG/ML (ref ?–0.16)
PROT SERPL-MCNC: 5.9 G/DL (ref 6.4–8.2)
RBC # BLD AUTO: 3.54 X10(6)UL
SODIUM SERPL-SCNC: 132 MMOL/L (ref 136–145)
WBC # BLD AUTO: 16.4 X10(3) UL (ref 4–11)

## 2024-03-15 PROCEDURE — 99232 SBSQ HOSP IP/OBS MODERATE 35: CPT | Performed by: HOSPITALIST

## 2024-03-15 PROCEDURE — 71045 X-RAY EXAM CHEST 1 VIEW: CPT | Performed by: INTERNAL MEDICINE

## 2024-03-15 PROCEDURE — 99233 SBSQ HOSP IP/OBS HIGH 50: CPT | Performed by: INTERNAL MEDICINE

## 2024-03-15 RX ORDER — ECHINACEA PURPUREA EXTRACT 125 MG
1 TABLET ORAL
Status: DISCONTINUED | OUTPATIENT
Start: 2024-03-15 | End: 2024-03-20

## 2024-03-15 RX ORDER — PREDNISONE 20 MG/1
40 TABLET ORAL
Status: DISCONTINUED | OUTPATIENT
Start: 2024-03-16 | End: 2024-03-20

## 2024-03-15 RX ORDER — POTASSIUM CHLORIDE 20 MEQ/1
40 TABLET, EXTENDED RELEASE ORAL EVERY 4 HOURS
Status: COMPLETED | OUTPATIENT
Start: 2024-03-15 | End: 2024-03-15

## 2024-03-15 NOTE — PROGRESS NOTES
Critical Care Progress Note     Assessment / Plan:  Acute respiratory failure - due to AECOPD and pneumonia. Extubated 3/14  - supplemental O2 as needed  AECOPD  - IV to PO steroids tomorrow  - BD protocol  Pneumonia - due to influenza and secondary bacterial pneumonia. PCT is significantly elevated  - Tamiflu  - Unasyn  Dispo  - transfer to the floor. Pulmonary service will follow on the floor      Subjective:  Dyspnea is improved  No new complaints    Objective:  Vitals:    03/15/24 0900 03/15/24 1000 03/15/24 1005 03/15/24 1040   BP:   146/63    BP Location:       Pulse: 102 98 90 91   Resp: 25 23 19   Temp:       TempSrc:       SpO2: 95% 92%  99%   Weight:       Height:         Physical Exam:  General: laying in bed  Skin: no rash, ulcers or subcutaneous nodules  Eyes: anicteric sclerae, moist conjunctivae  Head, ears, nose, throat: atraumatic, oropharynx clear with moist mucous membranes  Neck: trachea midline with no thyromegaly  Heart: regular rate and rhythm, no murmurs / rubs / gallops  Lungs: bibasilar wheezing  Abdomen: soft, nontender, nondistended   Extremities: no edema or cyanosis  Psych: interactive, answering questions appropriately, appropriate affect    Medications:  Reviewed in EMR    Lab Data:  Reviewed in EMR    Imaging:  I independently visualized all relevant chest imaging in PACS and agree with radiology interpretation except where noted.

## 2024-03-15 NOTE — PLAN OF CARE
Pt aox4. Delirium negative. Room air. Afebrile, SR/ST on tele, hemodynamically stable. Denies pain. Productive cough. Bm, voiding adequately see I/o flowsheet for details. Tolerating diet. Droplet isolation maintained. Electrolytes replaced per protocol. 1 piv removed this am, leaking. Oob to bathroom/chair with standby assist. Okay to transfer to floor when  bed available.

## 2024-03-15 NOTE — PLAN OF CARE
NURSING ADMISSION NOTE      Patient admitted via Cart  Oriented to room.  Safety precautions initiated.  Bed in low position.  Call light in reach.        Problem: Patient/Family Goals  Goal: Patient/Family Long Term Goal  Description: Patient's Long Term Goal: DC to home     Interventions:  - follow plan of care        - See additional Care Plan goals for specific interventions  Outcome: Progressing  Goal: Patient/Family Short Term Goal  Description: Patient's Short Term Goal: get better soon     Interventions:   - meds    - See additional Care Plan goals for specific interventions  Outcome: Progressing     Problem: CARDIOVASCULAR - ADULT  Goal: Maintains optimal cardiac output and hemodynamic stability  Description: INTERVENTIONS:  - Monitor vital signs, rhythm, and trends  - Monitor for bleeding, hypotension and signs of decreased cardiac output  - Evaluate effectiveness of vasoactive medications to optimize hemodynamic stability  - Monitor arterial and/or venous puncture sites for bleeding and/or hematoma  - Assess quality of pulses, skin color and temperature  - Assess for signs of decreased coronary artery perfusion - ex. Angina  - Evaluate fluid balance, assess for edema, trend weights  Outcome: Progressing  Goal: Absence of cardiac arrhythmias or at baseline  Description: INTERVENTIONS:  - Continuous cardiac monitoring, monitor vital signs, obtain 12 lead EKG if indicated  - Evaluate effectiveness of antiarrhythmic and heart rate control medications as ordered  - Initiate emergency measures for life threatening arrhythmias  - Monitor electrolytes and administer replacement therapy as ordered  Outcome: Progressing     Problem: RESPIRATORY - ADULT  Goal: Achieves optimal ventilation and oxygenation  Description: INTERVENTIONS:  - Assess for changes in respiratory status  - Assess for changes in mentation and behavior  - Position to facilitate oxygenation and minimize respiratory effort  - Oxygen  supplementation based on oxygen saturation or ABGs  - Provide Smoking Cessation handout, if applicable  - Encourage broncho-pulmonary hygiene including cough, deep breathe, Incentive Spirometry  - Assess the need for suctioning and perform as needed  - Assess and instruct to report SOB or any respiratory difficulty  - Respiratory Therapy support as indicated  - Manage/alleviate anxiety  - Monitor for signs/symptoms of CO2 retention  Outcome: Progressing     Problem: GASTROINTESTINAL - ADULT  Goal: Minimal or absence of nausea and vomiting  Description: INTERVENTIONS:  - Maintain adequate hydration with IV or PO as ordered and tolerated  - Nasogastric tube to low intermittent suction as ordered  - Evaluate effectiveness of ordered antiemetic medications  - Provide nonpharmacologic comfort measures as appropriate  - Advance diet as tolerated, if ordered  - Obtain nutritional consult as needed  - Evaluate fluid balance  Outcome: Progressing  Goal: Maintains or returns to baseline bowel function  Description: INTERVENTIONS:  - Assess bowel function  - Maintain adequate hydration with IV or PO as ordered and tolerated  - Evaluate effectiveness of GI medications  - Encourage mobilization and activity  - Obtain nutritional consult as needed  - Establish a toileting routine/schedule  - Consider collaborating with pharmacy to review patient's medication profile  Outcome: Progressing     Problem: GENITOURINARY - ADULT  Goal: Absence of urinary retention  Description: INTERVENTIONS:  - Assess patient’s ability to void and empty bladder  - Monitor intake/output and perform bladder scan as needed  - Follow urinary retention protocol/standard of care  - Consider collaborating with pharmacy to review patient's medication profile  - Implement strategies to promote bladder emptying  Outcome: Progressing     Problem: METABOLIC/FLUID AND ELECTROLYTES - ADULT  Goal: Glucose maintained within prescribed range  Description:  INTERVENTIONS:  - Monitor Blood Glucose as ordered  - Assess for signs and symptoms of hyperglycemia and hypoglycemia  - Administer ordered medications to maintain glucose within target range  - Assess barriers to adequate nutritional intake and initiate nutrition consult as needed  - Instruct patient on self management of diabetes  Outcome: Progressing  Goal: Electrolytes maintained within normal limits  Description: INTERVENTIONS:  - Monitor labs and rhythm and assess patient for signs and symptoms of electrolyte imbalances  - Administer electrolyte replacement as ordered  - Monitor response to electrolyte replacements, including rhythm and repeat lab results as appropriate  - Fluid restriction as ordered  - Instruct patient on fluid and nutrition restrictions as appropriate  Outcome: Progressing  Goal: Hemodynamic stability and optimal renal function maintained  Description: INTERVENTIONS:  - Monitor labs and assess for signs and symptoms of volume excess or deficit  - Monitor intake, output and patient weight  - Monitor urine specific gravity, serum osmolarity and serum sodium as indicated or ordered  - Monitor response to interventions for patient's volume status, including labs, urine output, blood pressure (other measures as available)  - Encourage oral intake as appropriate  - Instruct patient on fluid and nutrition restrictions as appropriate  Outcome: Progressing     Problem: HEMATOLOGIC - ADULT  Goal: Free from bleeding injury  Description: (Example usage: patient with low platelets)  INTERVENTIONS:  - Avoid intramuscular injections, enemas and rectal medication administration  - Ensure safe mobilization of patient  - Hold pressure on venipuncture sites to achieve adequate hemostasis  - Assess for signs and symptoms of internal bleeding  - Monitor lab trends  - Patient is to report abnormal signs of bleeding to staff  - Avoid use of toothpicks and dental floss  - Use electric shaver for shaving  - Use  soft bristle tooth brush  - Limit straining and forceful nose blowing  Outcome: Progressing     Problem: PAIN - ADULT  Goal: Verbalizes/displays adequate comfort level or patient's stated pain goal  Description: INTERVENTIONS:  - Encourage pt to monitor pain and request assistance  - Assess pain using appropriate pain scale  - Administer analgesics based on type and severity of pain and evaluate response  - Implement non-pharmacological measures as appropriate and evaluate response  - Consider cultural and social influences on pain and pain management  - Manage/alleviate anxiety  - Utilize distraction and/or relaxation techniques  - Monitor for opioid side effects  - Notify MD/LIP if interventions unsuccessful or patient reports new pain  - Anticipate increased pain with activity and pre-medicate as appropriate  Outcome: Progressing     Problem: SAFETY ADULT - FALL  Goal: Free from fall injury  Description: INTERVENTIONS:  - Assess pt frequently for physical needs  - Identify cognitive and physical deficits and behaviors that affect risk of falls.  - [place] fall precautions as indicated by assessment.  - Educate pt/family on patient safety including physical limitations  - Instruct pt to call for assistance with activity based on assessment  - Modify environment to reduce risk of injury  - Provide assistive devices as appropriate  - Consider OT/PT consult to assist with strengthening/mobility  - Encourage toileting schedule  Outcome: Progressing

## 2024-03-15 NOTE — PROGRESS NOTES
Pt is admitted for SOB. Droplet precaution. Pt is AOX4.  VSS, afebrile and denies any pain. SpO2 maintained on RA. . SOB with ambulation, . Congested cough. Lung sounds wheezy. Nebs. Tele-NSR. Lovenox. Cardiac control  diet. Denies any n/v/d. Voids. Up with SBA.  IV steroid. IV ABX. WCTM. Pt is updated with plan of care.

## 2024-03-15 NOTE — PROGRESS NOTES
[facility]   part of [facility]     Hospitalist Progress Note     Ella Berry Patient Status:  Inpatient    1945 MRN [id number]   Location [facility] 4SW-A Attending Erin Beckford MD   Hosp Day # 2 PCP None Pcp     Chief Complaint: shortness of breath    Subjective:     Awake, alert, off o2, feels good today    Objective:    Review of Systems:   A comprehensive review of systems was completed; pertinent positive and negatives stated in subjective.    Vital signs:  Temp:  [97.2 °F (36.2 °C)-98.1 °F (36.7 °C)] 98 °F (36.7 °C)  Pulse:  [] 90  Resp:  [13-25] 23  BP: ()/(44-70) 146/63  SpO2:  [89 %-98 %] 92 %  FiO2 (%):  [50 %] 50 %    Physical Exam:    General: No acute distress  Respiratory: right base wheezing  Cardiovascular: S1, S2, regular rate and rhythm  Abdomen: Soft, Non-tender, non-distended, positive bowel sounds  Neuro: No new focal deficits.   Extremities: No edema      Diagnostic Data:    Labs:  Recent Labs   Lab 24  1049 24  0438 03/15/24  0400   WBC 9.3 18.3* 16.4*   HGB 13.4 13.1 11.3*   .0* 98.8 98.0   .0 221.0 188.0       Recent Labs   Lab 24  1049 24  0438 03/15/24  0400   * 180* 166*   BUN 13 22 31*   CREATSERUM 0.88 1.13* 1.02   CA 9.7 9.2 8.9   ALB 3.7  --  2.8*   * 131* 132*   K 3.9 4.3 3.4*    103 100   CO2 24.0 17.0* 23.0   ALKPHO 111  --  108   AST 16  --  404*   ALT 29  --  471*   BILT 0.6  --  0.5   TP 7.1  --  5.9*       Estimated Creatinine Clearance: 34.9 mL/min (based on SCr of 1.02 mg/dL).    Recent Labs   Lab 24  1049   TROPHS 10       No results for input(s): \"PTP\", \"INR\" in the last 168 hours.         Microbiology    Hospital Encounter on 24   1. Blood Culture     Status: None (Preliminary result)    Collection Time: 24 11:06 AM    Specimen: Blood,peripheral   Result Value Ref Range    Blood Culture Result No Growth 1 Day N/A         Imaging: Reviewed in  Epic.    Medications:    potassium chloride  40 mEq Oral Q4H    ipratropium-albuterol  3 mL Nebulization 6 times per day    gabapentin  300 mg Oral Noon    gabapentin  600 mg Oral Nightly    pramipexole  0.125 mg Oral 5x Daily    levothyroxine  88 mcg Oral Before breakfast    enoxaparin  40 mg Subcutaneous Daily    methylPREDNISolone  60 mg Intravenous Q8H    oseltamivir  30 mg Oral Q12H    piperacillin-tazobactam  3.375 g Intravenous Q8H    pantoprazole  40 mg Intravenous QAM AC    Or    pantoprazole  40 mg Oral QAM AC       Assessment & Plan:      #Acute respiratory failure  -due to influenza with underlying COPD  -steroids  -Antibiotics  -tamiflu  -nebs    #Acute COPD exacerbation  -steroids  -nebs    # YAMILET    # Elevated LFTs  -recheck, trend  -US if remains eleavated      Erin Beckford MD    Supplementary Documentation:     Quality:  DVT Mechanical Prophylaxis:   SCDs,    DVT Pharmacologic Prophylaxis   Medication    enoxaparin (Lovenox) 40 MG/0.4ML SUBQ injection 40 mg                Code Status: Full Code  Wylie: No urinary catheter in place  Wylie Duration (in days):   Central line:    STORM:     Discharge is dependent on: progress  At this point Ms. Berry is expected to be discharge to: home    The 21st Century Cures Act makes medical notes like these available to patients in the interest of transparency. Please be advised this is a medical document. Medical documents are intended to carry relevant information, facts as evident, and the clinical opinion of the practitioner. The medical note is intended as peer to peer communication and may appear blunt or direct. It is written in medical language and may contain abbreviations or verbiage that are unfamiliar.             **Certification      PHYSICIAN Certification of Need for Inpatient Hospitalization - Initial Certification    Patient will require inpatient services that will reasonably be expected to span two midnight's based on the clinical documentation  in H+P.   Based on patients current state of illness, I anticipate that, after discharge, patient will require TBD.

## 2024-03-15 NOTE — PLAN OF CARE
GCS 15. VSS, weaned to room air this shift. Wheezing still heard on auscultation. Afebrile. Voiding spontaneously, UOP QS, clear yellow. Comfort and safety maintained.    Problem: CARDIOVASCULAR - ADULT  Goal: Maintains optimal cardiac output and hemodynamic stability  Description: INTERVENTIONS:  - Monitor vital signs, rhythm, and trends  - Monitor for bleeding, hypotension and signs of decreased cardiac output  - Evaluate effectiveness of vasoactive medications to optimize hemodynamic stability  - Monitor arterial and/or venous puncture sites for bleeding and/or hematoma  - Assess quality of pulses, skin color and temperature  - Assess for signs of decreased coronary artery perfusion - ex. Angina  - Evaluate fluid balance, assess for edema, trend weights  Outcome: Progressing  Goal: Absence of cardiac arrhythmias or at baseline  Description: INTERVENTIONS:  - Continuous cardiac monitoring, monitor vital signs, obtain 12 lead EKG if indicated  - Evaluate effectiveness of antiarrhythmic and heart rate control medications as ordered  - Initiate emergency measures for life threatening arrhythmias  - Monitor electrolytes and administer replacement therapy as ordered  Outcome: Progressing     Problem: RESPIRATORY - ADULT  Goal: Achieves optimal ventilation and oxygenation  Description: INTERVENTIONS:  - Assess for changes in respiratory status  - Assess for changes in mentation and behavior  - Position to facilitate oxygenation and minimize respiratory effort  - Oxygen supplementation based on oxygen saturation or ABGs  - Provide Smoking Cessation handout, if applicable  - Encourage broncho-pulmonary hygiene including cough, deep breathe, Incentive Spirometry  - Assess the need for suctioning and perform as needed  - Assess and instruct to report SOB or any respiratory difficulty  - Respiratory Therapy support as indicated  - Manage/alleviate anxiety  - Monitor for signs/symptoms of CO2 retention  Outcome: Progressing      Problem: GASTROINTESTINAL - ADULT  Goal: Minimal or absence of nausea and vomiting  Description: INTERVENTIONS:  - Maintain adequate hydration with IV or PO as ordered and tolerated  - Nasogastric tube to low intermittent suction as ordered  - Evaluate effectiveness of ordered antiemetic medications  - Provide nonpharmacologic comfort measures as appropriate  - Advance diet as tolerated, if ordered  - Obtain nutritional consult as needed  - Evaluate fluid balance  Outcome: Progressing  Goal: Maintains or returns to baseline bowel function  Description: INTERVENTIONS:  - Assess bowel function  - Maintain adequate hydration with IV or PO as ordered and tolerated  - Evaluate effectiveness of GI medications  - Encourage mobilization and activity  - Obtain nutritional consult as needed  - Establish a toileting routine/schedule  - Consider collaborating with pharmacy to review patient's medication profile  Outcome: Progressing     Problem: GENITOURINARY - ADULT  Goal: Absence of urinary retention  Description: INTERVENTIONS:  - Assess patient’s ability to void and empty bladder  - Monitor intake/output and perform bladder scan as needed  - Follow urinary retention protocol/standard of care  - Consider collaborating with pharmacy to review patient's medication profile  - Implement strategies to promote bladder emptying  Outcome: Progressing     Problem: METABOLIC/FLUID AND ELECTROLYTES - ADULT  Goal: Glucose maintained within prescribed range  Description: INTERVENTIONS:  - Monitor Blood Glucose as ordered  - Assess for signs and symptoms of hyperglycemia and hypoglycemia  - Administer ordered medications to maintain glucose within target range  - Assess barriers to adequate nutritional intake and initiate nutrition consult as needed  - Instruct patient on self management of diabetes  Outcome: Progressing  Goal: Electrolytes maintained within normal limits  Description: INTERVENTIONS:  - Monitor labs and rhythm and  assess patient for signs and symptoms of electrolyte imbalances  - Administer electrolyte replacement as ordered  - Monitor response to electrolyte replacements, including rhythm and repeat lab results as appropriate  - Fluid restriction as ordered  - Instruct patient on fluid and nutrition restrictions as appropriate  Outcome: Progressing  Goal: Hemodynamic stability and optimal renal function maintained  Description: INTERVENTIONS:  - Monitor labs and assess for signs and symptoms of volume excess or deficit  - Monitor intake, output and patient weight  - Monitor urine specific gravity, serum osmolarity and serum sodium as indicated or ordered  - Monitor response to interventions for patient's volume status, including labs, urine output, blood pressure (other measures as available)  - Encourage oral intake as appropriate  - Instruct patient on fluid and nutrition restrictions as appropriate  Outcome: Progressing     Problem: HEMATOLOGIC - ADULT  Goal: Free from bleeding injury  Description: (Example usage: patient with low platelets)  INTERVENTIONS:  - Avoid intramuscular injections, enemas and rectal medication administration  - Ensure safe mobilization of patient  - Hold pressure on venipuncture sites to achieve adequate hemostasis  - Assess for signs and symptoms of internal bleeding  - Monitor lab trends  - Patient is to report abnormal signs of bleeding to staff  - Avoid use of toothpicks and dental floss  - Use electric shaver for shaving  - Use soft bristle tooth brush  - Limit straining and forceful nose blowing  Outcome: Progressing     Problem: PAIN - ADULT  Goal: Verbalizes/displays adequate comfort level or patient's stated pain goal  Description: INTERVENTIONS:  - Encourage pt to monitor pain and request assistance  - Assess pain using appropriate pain scale  - Administer analgesics based on type and severity of pain and evaluate response  - Implement non-pharmacological measures as appropriate and  evaluate response  - Consider cultural and social influences on pain and pain management  - Manage/alleviate anxiety  - Utilize distraction and/or relaxation techniques  - Monitor for opioid side effects  - Notify MD/LIP if interventions unsuccessful or patient reports new pain  - Anticipate increased pain with activity and pre-medicate as appropriate  Outcome: Progressing     Problem: SAFETY ADULT - FALL  Goal: Free from fall injury  Description: INTERVENTIONS:  - Assess pt frequently for physical needs  - Identify cognitive and physical deficits and behaviors that affect risk of falls.  - [place] fall precautions as indicated by assessment.  - Educate pt/family on patient safety including physical limitations  - Instruct pt to call for assistance with activity based on assessment  - Modify environment to reduce risk of injury  - Provide assistive devices as appropriate  - Consider OT/PT consult to assist with strengthening/mobility  - Encourage toileting schedule  Outcome: Progressing

## 2024-03-16 PROBLEM — I47.10 SVT (SUPRAVENTRICULAR TACHYCARDIA) (HCC): Status: ACTIVE | Noted: 2024-03-16

## 2024-03-16 LAB
ALBUMIN SERPL-MCNC: 2.9 G/DL (ref 3.4–5)
ALBUMIN/GLOB SERPL: 0.8 {RATIO} (ref 1–2)
ALP LIVER SERPL-CCNC: 117 U/L
ALT SERPL-CCNC: 370 U/L
ANION GAP SERPL CALC-SCNC: 4 MMOL/L (ref 0–18)
ANION GAP SERPL CALC-SCNC: 5 MMOL/L (ref 0–18)
APTT PPP: 27.1 SECONDS (ref 23.3–35.6)
APTT PPP: 64.7 SECONDS (ref 23.3–35.6)
AST SERPL-CCNC: 174 U/L (ref 15–37)
BASOPHILS # BLD AUTO: 0.02 X10(3) UL (ref 0–0.2)
BASOPHILS NFR BLD AUTO: 0.1 %
BILIRUB SERPL-MCNC: 0.6 MG/DL (ref 0.1–2)
BUN BLD-MCNC: 26 MG/DL (ref 9–23)
BUN BLD-MCNC: 30 MG/DL (ref 9–23)
CALCIUM BLD-MCNC: 8.7 MG/DL (ref 8.5–10.1)
CALCIUM BLD-MCNC: 8.8 MG/DL (ref 8.5–10.1)
CHLORIDE SERPL-SCNC: 104 MMOL/L (ref 98–112)
CHLORIDE SERPL-SCNC: 106 MMOL/L (ref 98–112)
CO2 SERPL-SCNC: 23 MMOL/L (ref 21–32)
CO2 SERPL-SCNC: 23 MMOL/L (ref 21–32)
CREAT BLD-MCNC: 0.91 MG/DL
CREAT BLD-MCNC: 0.94 MG/DL
EGFRCR SERPLBLD CKD-EPI 2021: 62 ML/MIN/1.73M2 (ref 60–?)
EGFRCR SERPLBLD CKD-EPI 2021: 65 ML/MIN/1.73M2 (ref 60–?)
EOSINOPHIL # BLD AUTO: 0 X10(3) UL (ref 0–0.7)
EOSINOPHIL NFR BLD AUTO: 0 %
ERYTHROCYTE [DISTWIDTH] IN BLOOD BY AUTOMATED COUNT: 16.1 %
GLOBULIN PLAS-MCNC: 3.5 G/DL (ref 2.8–4.4)
GLUCOSE BLD-MCNC: 148 MG/DL (ref 70–99)
GLUCOSE BLD-MCNC: 148 MG/DL (ref 70–99)
GLUCOSE BLD-MCNC: 210 MG/DL (ref 70–99)
HCT VFR BLD AUTO: 36.8 %
HGB BLD-MCNC: 12.4 G/DL
IMM GRANULOCYTES # BLD AUTO: 0.08 X10(3) UL (ref 0–1)
IMM GRANULOCYTES NFR BLD: 0.6 %
LYMPHOCYTES # BLD AUTO: 0.95 X10(3) UL (ref 1–4)
LYMPHOCYTES NFR BLD AUTO: 6.5 %
MAGNESIUM SERPL-MCNC: 2.1 MG/DL (ref 1.6–2.6)
MCH RBC QN AUTO: 32.1 PG (ref 26–34)
MCHC RBC AUTO-ENTMCNC: 33.7 G/DL (ref 31–37)
MCV RBC AUTO: 95.3 FL
MONOCYTES # BLD AUTO: 0.64 X10(3) UL (ref 0.1–1)
MONOCYTES NFR BLD AUTO: 4.4 %
NEUTROPHILS # BLD AUTO: 12.83 X10 (3) UL (ref 1.5–7.7)
NEUTROPHILS # BLD AUTO: 12.83 X10(3) UL (ref 1.5–7.7)
NEUTROPHILS NFR BLD AUTO: 88.4 %
OSMOLALITY SERPL CALC.SUM OF ELEC: 285 MOSM/KG (ref 275–295)
OSMOLALITY SERPL CALC.SUM OF ELEC: 285 MOSM/KG (ref 275–295)
PLATELET # BLD AUTO: 203 10(3)UL (ref 150–450)
POTASSIUM SERPL-SCNC: 4 MMOL/L (ref 3.5–5.1)
POTASSIUM SERPL-SCNC: 4.4 MMOL/L (ref 3.5–5.1)
PROT SERPL-MCNC: 6.4 G/DL (ref 6.4–8.2)
RBC # BLD AUTO: 3.86 X10(6)UL
SODIUM SERPL-SCNC: 132 MMOL/L (ref 136–145)
SODIUM SERPL-SCNC: 133 MMOL/L (ref 136–145)
TSI SER-ACNC: 2.42 MIU/ML (ref 0.36–3.74)
WBC # BLD AUTO: 14.5 X10(3) UL (ref 4–11)

## 2024-03-16 PROCEDURE — 99291 CRITICAL CARE FIRST HOUR: CPT | Performed by: HOSPITALIST

## 2024-03-16 RX ORDER — ADENOSINE 3 MG/ML
6 INJECTION, SOLUTION INTRAVENOUS ONCE
Status: DISCONTINUED | OUTPATIENT
Start: 2024-03-16 | End: 2024-03-20

## 2024-03-16 RX ORDER — DOFETILIDE 0.12 MG/1
125 CAPSULE ORAL EVERY 12 HOURS
Status: DISCONTINUED | OUTPATIENT
Start: 2024-03-16 | End: 2024-03-16

## 2024-03-16 RX ORDER — METOCLOPRAMIDE HYDROCHLORIDE 5 MG/ML
5 INJECTION INTRAMUSCULAR; INTRAVENOUS EVERY 8 HOURS PRN
Status: DISCONTINUED | OUTPATIENT
Start: 2024-03-16 | End: 2024-03-20

## 2024-03-16 RX ORDER — METOPROLOL TARTRATE 1 MG/ML
INJECTION, SOLUTION INTRAVENOUS
Status: COMPLETED
Start: 2024-03-16 | End: 2024-03-16

## 2024-03-16 RX ORDER — LABETALOL HYDROCHLORIDE 5 MG/ML
10 INJECTION, SOLUTION INTRAVENOUS EVERY 4 HOURS PRN
Status: DISCONTINUED | OUTPATIENT
Start: 2024-03-16 | End: 2024-03-20

## 2024-03-16 RX ORDER — HEPARIN SODIUM AND DEXTROSE 10000; 5 [USP'U]/100ML; G/100ML
INJECTION INTRAVENOUS CONTINUOUS
Status: ACTIVE | OUTPATIENT
Start: 2024-03-16 | End: 2024-03-17

## 2024-03-16 RX ORDER — METOPROLOL TARTRATE 1 MG/ML
5 INJECTION, SOLUTION INTRAVENOUS EVERY 6 HOURS
Status: DISCONTINUED | OUTPATIENT
Start: 2024-03-16 | End: 2024-03-18

## 2024-03-16 RX ORDER — DILTIAZEM HYDROCHLORIDE 5 MG/ML
10 INJECTION INTRAVENOUS ONCE
Status: COMPLETED | OUTPATIENT
Start: 2024-03-16 | End: 2024-03-16

## 2024-03-16 RX ORDER — ADENOSINE 3 MG/ML
12 INJECTION, SOLUTION INTRAVENOUS ONCE
Status: COMPLETED | OUTPATIENT
Start: 2024-03-16 | End: 2024-03-16

## 2024-03-16 RX ORDER — HEPARIN SODIUM AND DEXTROSE 10000; 5 [USP'U]/100ML; G/100ML
12 INJECTION INTRAVENOUS ONCE
Status: COMPLETED | OUTPATIENT
Start: 2024-03-16 | End: 2024-03-16

## 2024-03-16 RX ORDER — HEPARIN SODIUM 1000 [USP'U]/ML
60 INJECTION, SOLUTION INTRAVENOUS; SUBCUTANEOUS ONCE
Status: COMPLETED | OUTPATIENT
Start: 2024-03-16 | End: 2024-03-16

## 2024-03-16 RX ORDER — ADENOSINE 3 MG/ML
12 INJECTION, SOLUTION INTRAVENOUS ONCE
Status: DISCONTINUED | OUTPATIENT
Start: 2024-03-16 | End: 2024-03-20

## 2024-03-16 NOTE — PROGRESS NOTES
[facility]   part of [facility]     Hospitalist Progress Note     Ella Berry Patient Status:  Inpatient    1945 MRN [id number]   Location [facility] 2NE-A Attending Erin Beckford MD   Hosp Day # 3 PCP None Pcp     Chief Complaint: shortness of breath    Subjective:     Patient converted to SVT/A.fib overnight, transferred to CTU after receiving IV cardizem, adenosine and digoxin,  started on cardizem gtt    Objective:    Review of Systems:   A comprehensive review of systems was completed; pertinent positive and negatives stated in subjective.    Vital signs:  Temp:  [97.7 °F (36.5 °C)-98.5 °F (36.9 °C)] 97.7 °F (36.5 °C)  Pulse:  [] 141  Resp:  [13-25] 20  BP: (109-155)/(56-98) 128/88  SpO2:  [91 %-99 %] 95 %    Physical Exam:    General: No acute distress  Respiratory: No wheezes, no rhonchi  Cardiovascular: S1, S2, regular rate and rhythm  Abdomen: Soft, Non-tender, non-distended, positive bowel sounds  Neuro: No new focal deficits.   Extremities: No edema      Diagnostic Data:    Labs:  Recent Labs   Lab 24  1049 24  0438 03/15/24  0400 24  0506   WBC 9.3 18.3* 16.4* 14.5*   HGB 13.4 13.1 11.3* 12.4   .0* 98.8 98.0 95.3   .0 221.0 188.0 203.0       Recent Labs   Lab 24  1049 24  0438 03/15/24  0400 03/15/24  1622 24  0506   * 180* 166*  --  148*   BUN 13 22 31*  --  30*   CREATSERUM 0.88 1.13* 1.02  --  0.91   CA 9.7 9.2 8.9  --  8.7   ALB 3.7  --  2.8*  --  2.9*   * 131* 132*  --  133*   K 3.9 4.3 3.4* 4.0 4.0    103 100  --  106   CO2 24.0 17.0* 23.0  --  23.0   ALKPHO 111  --  108  --  117   AST 16  --  404*  --  174*   ALT 29  --  471*  --  370*   BILT 0.6  --  0.5  --  0.6   TP 7.1  --  5.9*  --  6.4       Estimated Creatinine Clearance: 39.1 mL/min (based on SCr of 0.91 mg/dL).    Recent Labs   Lab 24  1049   TROPHS 10       No results for input(s): \"PTP\", \"INR\" in the last 168 hours.    Lab  Results   Component Value Date    TSH 2.420 03/16/2024             Microbiology    Hospital Encounter on 03/13/24   1. Blood Culture     Status: None (Preliminary result)    Collection Time: 03/13/24 11:06 AM    Specimen: Blood,peripheral   Result Value Ref Range    Blood Culture Result No Growth 2 Days N/A         Imaging: Reviewed in Epic.    Medications:    metoprolol  5 mg Intravenous Q6H    adenosine  6 mg Intravenous Once    adenosine  12 mg Intravenous Once    predniSONE  40 mg Oral Daily with breakfast    ampicillin-sulbactam  3 g Intravenous q6h    ipratropium-albuterol  3 mL Nebulization 6 times per day    gabapentin  300 mg Oral Noon    gabapentin  600 mg Oral Nightly    pramipexole  0.125 mg Oral 5x Daily    levothyroxine  88 mcg Oral Before breakfast    oseltamivir  30 mg Oral Q12H    pantoprazole  40 mg Intravenous QAM AC    Or    pantoprazole  40 mg Oral QAM AC       Assessment & Plan:      #New on set A.fib with RVR  -on cardizem drip, IV PRN metoprolol and heparin gtt  -Tele monitoring  -ECHO reviewed from 3/14    #Acute COPD exacerbation  #Influenza  -tamiflu  -steroids  -nebs    # YAMILET    # Elevated LFTs  -improving         Erin Beckford MD    Supplementary Documentation:     Quality:  DVT Mechanical Prophylaxis:   SCDs,    DVT Pharmacologic Prophylaxis   Medication    heparin (Porcine) 77412 units/250mL infusion ACS/AFIB CONTINUOUS                Code Status: Full Code  Wylie: External urinary catheter in place  Wylie Duration (in days):   Central line:    STORM: 3/18/2024    Discharge is dependent on: progress  At this point Ms. Berry is expected to be discharge to: home    The 21st Century Cures Act makes medical notes like these available to patients in the interest of transparency. Please be advised this is a medical document. Medical documents are intended to carry relevant information, facts as evident, and the clinical opinion of the practitioner. The medical note is intended as peer to  peer communication and may appear blunt or direct. It is written in medical language and may contain abbreviations or verbiage that are unfamiliar.

## 2024-03-16 NOTE — CODE DOCUMENTATION
[facility]     Ella Berry Patient Status:  Inpatient    1945 MRN [id number]   Location [facility] 2NE-A Attending Erin Beckford MD   Hosp Day # 3 PCP None Pcp     Reason for RRT: SVT    Physical Exam:    /89   Pulse (!) 130   Temp 98.3 °F (36.8 °C) (Oral)   Resp 20   Ht 5' 1\" (1.549 m)   Wt 172 lb 9.9 oz (78.3 kg)   SpO2 95%   BMI 32.62 kg/m²   General: Patient is awake alert and oriented.  No acute respiratory distress  Respiratory: CTAB  Cardiovascular: Tachycardia  Abdomen: Soft, nontender/nondistended, positive bowel sounds  Neurologic: No focal deficits  Extremities: No C/C/E    Diagnostic Data:      Labs: Reviewed    Imaging: Reviewed    ASSESSMENT / PLAN:     Wide-complex SVT  Prior to rapid response being called tried giving patient 10 mg of IV Cardizem which did not change the patient's heart rate.  Heart rate still in the 160s to 180s.  Rapid response called for adenosine.  6, 12, 12 mg doses SPT initially appropriate then came back with heart rates going up into the 190s to 200s.  Gave 10 of IV metoprolol which slowed her heart rate down into the 150s to 160s and then gave a dose of 0.250 mg IV digoxin which slowed the heart rate down into the 130s 140s.  Patient's blood pressure remained stable.  Patient transferred to CTU to be started on amiodarone drip.  Discussed case with on-call cardiology APN.      Critical care time: 45 minutes  [id number]    Rafael Beaver, DO  3/16/2024

## 2024-03-16 NOTE — PROGRESS NOTES
RRT was called at PMU for SVT. Received pt. Alert and o x 4 , not in any form of distress ; on RA with audible wheezing noted.Tele shows Afib RVR , -150's ; EKG done. Started on the Cardizem gtt with 5 mg bolus given , ordered by MAURIZIO QUEVEDO.  BP stable. Updated pt. With POC , verbalized understanding. Will start Heparin gtt pending labs. Cont. Monitor per tele/labs/v/s. Meds as ordered.         Skin assessment done with c0-ROBERT Eden , no skin breakdown noted. Fall/safety precautions instructed , placed call light w/in reach.

## 2024-03-16 NOTE — PROGRESS NOTES
@ 0221 MD paged. Patient HR sustaining 160-170s, Tachycardiac on telemetry. Alert, room air 02 sats WNL, denied pain. Afebrile. Order for EKG completed.     @0234 MD paged on EKG results. MD ordered IV Cardizem 10 mg , once. MD coming to the bedside.     @0340 Rapid response paged to administer adenosine. -180s, sustaining. Patient placed defibrillator to monitor HR.  IV adenosine 6mg given   IV adenosine 12 mg given X2  IV metoprolol 10 mg  IV digoxin 1ml  IV metoprolol 5 mg     Orders to send to CTU2 room 2601. MD consulted Cardiology.   Patient  Ax04, -140s, /87 , On room air. Afebrile. Report given to CTU2.

## 2024-03-16 NOTE — PHYSICAL THERAPY NOTE
PT eval orders received, chart reviewed. RN reports pt not appropriate for therapy today, reports difficulty controlling heart rate. Requests check back tomorrow. Will continue to follow and complete PT eval as pt appropriate.

## 2024-03-16 NOTE — PROGRESS NOTES
Problem:  Influenza  A/ Copd exacerbation  Ax04. Hearing aids. Telemetry, , 02 sat 93-96%, Wheezing at times, Afebrile, Denied pain, Productive cough  Continent, Cardiac diet, Up with 1 walker.   POC: IV Unasyn, Tamiflu, Mirapex

## 2024-03-16 NOTE — CONSULTS
[facility]    Ella Berry Patient Status:  Inpatient    1945 MRN [id number]   Location [facility] 2NE-A Attending Erin Beckford MD   Hosp Day # 3 PCP None Pcp     Date of Admission: 3/13/2024 10:35 AM  Admission Diagnosis: Morbid obesity (HCC) [E66.01]  Hyponatremia [E87.1]  Influenza [J11.1]  Hypoxia [R09.02]  COPD exacerbation (HCC) [J44.1]  Congestive heart failure, unspecified HF chronicity, unspecified heart failure type (HCC) [I50.9]  Reason for Consult: hypoxia     History of Present Illness: 80 yo female with history of HTN, COPD, obesity who presented on 3/13 with increased SOB.  Pt was travelling through [place] (initially from [place]) when she noted acute worsening of dyspnea.  In the ER pt was found to be in distress, workup was notable for +influenza and pt was placed on bipap for increased WOB.  Pt was sent for CTA in the ER where she vomited worsening respiratory status further, requiring intubation in the ER.  Pt was subsequently transferred to the ICU where she was treated with steroids, tamiflu, unasyn and IV diuretics.  During admission pt went into A.Fib with RVR.  She had echo completed with preserved EF, PASP: 35-40mmHg.  Cardiology was consulted, pt was placed on heparin and ultimately transitioned to DOAC.  Pt was transferred to the floor on 3/15 and pulmonary was consulted.  Pt continues to have SOB as well as raspy voice.  A.Fib has been difficult to control.     Past Medical History:   Diagnosis Date    COPD (chronic obstructive pulmonary disease) (HCC)     Essential hypertension     Left bundle branch block     Metabolic encephalopathy     Pneumonia       History reviewed. No pertinent surgical history.   Allergies   Allergen Reactions    Erythromycin UNKNOWN        Social History:   reports that she has never smoked. She has never been exposed to tobacco smoke. She has never used smokeless tobacco. She reports that she does not drink alcohol and does not use  drugs.      Family History:  History reviewed. No pertinent family history.      Home Medications:  Outpatient Medications Marked as Taking for the 3/13/24 encounter (Hospital Encounter)   Medication Sig Dispense Refill    gabapentin 300 MG Oral Cap Take 1 capsule (300 mg total) by mouth Noon.      gabapentin 600 MG Oral Tab Take 1 tablet (600 mg total) by mouth at bedtime.      ferrous sulfate 325 (65 FE) MG Oral Tab EC Take 1 tablet (325 mg total) by mouth daily with breakfast.      valsartan 160 MG Oral Tab Take 1 tablet (160 mg total) by mouth daily.      colchicine 0.6 MG Oral Tab Take 1 tablet (0.6 mg total) by mouth daily.      pramipexole 0.125 MG Oral Tab Take 1 tablet (0.125 mg total) by mouth 5 (five) times daily.      Metoprolol Succinate  MG Oral Tablet 24 Hr Take 1 tablet (200 mg total) by mouth daily.      Mirabegron ER (MYRBETRIQ) 50 MG Oral Tablet 24 Hr Take 1 tablet (50 mg total) by mouth daily.      clonazePAM 0.5 MG Oral Tab Take 1 tablet (0.5 mg total) by mouth daily.      levETIRAcetam  MG Oral Tablet 24 Hr Take 1 tablet (500 mg total) by mouth daily.      levothyroxine 88 MCG Oral Tab Take 1 tablet (88 mcg total) by mouth before breakfast.      pantoprazole 40 MG Oral Tab EC Take 1 tablet (40 mg total) by mouth every morning before breakfast.      methotrexate 2.5 MG Oral Tab Take 1 tablet (2.5 mg total) by mouth twice a week. Take 5 tablets by mouth on Saturday and Sunday      leflunomide 10 MG Oral Tab Take 1 tablet (10 mg total) by mouth daily.      Tiotropium Bromide Monohydrate 1.25 MCG/ACT Inhalation Aero Soln Inhale 18 mcg into the lungs daily.      zafirlukast 20 MG Oral Tab Take 1 tablet (20 mg total) by mouth daily.      hydroxychloroquine 200 MG Oral Tab Take 1 tablet (200 mg total) by mouth 2 (two) times daily.      buPROPion  MG Oral Tablet 24 Hr Take 1 tablet (300 mg total) by mouth daily.      fesoterodine ER 4 MG Oral Tablet 24 Hr Take 1 tablet (4 mg total) by  mouth daily.          Current Medications:    Current Facility-Administered Medications:     melatonin cap/tab 5 mg, 5 mg, Oral, Nightly PRN    metoprolol (Lopressor) 5 mg/5mL injection 5 mg, 5 mg, Intravenous, Q6H    adenosine (Adenocard) 6 mg/2mL injection 6 mg, 6 mg, Intravenous, Once    adenosine (Adenocard) 6 mg/2mL injection 12 mg, 12 mg, Intravenous, Once    [COMPLETED] dilTIAZem 5 mg BOLUS FROM BAG infusion, 5 mg, Intravenous, Once **AND** dilTIAZem (cardIZEM) 100 mg in sodium chloride 0.9% 100 mL IVPB-ADDV, 2.5-20 mg/hr, Intravenous, Continuous    heparin (Porcine) 54702 units/250mL infusion ACS/AFIB CONTINUOUS, 200-3,000 Units/hr, Intravenous, Continuous    metoclopramide (Reglan) 5 mg/mL injection 5 mg, 5 mg, Intravenous, Q8H PRN    dofetilide (Tikosyn) cap 125 mcg, 125 mcg, Oral, Q12H    predniSONE (Deltasone) tab 40 mg, 40 mg, Oral, Daily with breakfast    ampicillin-sulbactam (Unasyn) 3 g in sodium chloride 0.9% 100mL IVPB-ADD, 3 g, Intravenous, q6h    sodium chloride (Saline Mist) 0.65 % nasal solution 1 spray, 1 spray, Each Nare, Q3H PRN    ipratropium-albuterol (Duoneb) 0.5-2.5 (3) MG/3ML inhalation solution 3 mL, 3 mL, Nebulization, 6 times per day    gabapentin (Neurontin) cap 300 mg, 300 mg, Oral, Noon    gabapentin (Neurontin) tab 600 mg, 600 mg, Oral, Nightly    pramipexole (Mirapex) tab 0.125 mg, 0.125 mg, Oral, 5x Daily    levothyroxine (Synthroid) tab 88 mcg, 88 mcg, Oral, Before breakfast    ondansetron (Zofran) 4 MG/2ML injection 4 mg, 4 mg, Intravenous, Q6H PRN    oseltamivir (Tamiflu) 6 MG/ML oral suspension 30 mg, 30 mg, Oral, Q12H    pantoprazole (Protonix) 40 mg in sodium chloride 0.9% PF 10 mL IV push, 40 mg, Intravenous, QAM AC **OR** pantoprazole (Protonix) DR tab 40 mg, 40 mg, Oral, QAM AC     Review of Systems:  Constitutional: denies weight loss, fevers, chills, night sweats, or fatigue  HEENT: denies vision or hearing changes, eye pain, tinnitus, hearing loss, sore throat,  epistaxis, sinus congestion  Cardiovascular: denies chest pain, PND, palpitations, edema, orthopnea, or syncope  Respiratory: +SOB, dyspnea on exertion,   GI: denies nausea, vomiting, diarrhea, constipation, melena, abdominal pain  : denies hematuria, dysuria, hesitancy, or incontinence  Musculoskeletal: denies arthralgias, myalgias, muscle weakness, or joint swelling  Skin: denies rash or pruritis; no jaundice  Neurologic: denies numbness, weakness, ataxia, tremors, or vertigo  Psychiatric: denies insomnia, depression, anxiety, or drug abuse  Endocrine: denies polydypsia, polyuria, cold/heat intolerance  Hematologic/lymphatic: denies easy bruising, new blood clots, or lymphedema  Allergic/immunologic: denies hay fever, hives, or new allergies       OBJECTIVE:  Patient Vitals for the past 24 hrs:   BP Temp Temp src Pulse Resp SpO2   03/16/24 1215 123/69 97.4 °F (36.3 °C) Oral 85 22 95 %   03/16/24 0907 -- -- -- 102 -- --   03/16/24 0747 -- -- -- -- -- 95 %   03/16/24 0720 128/88 97.7 °F (36.5 °C) Oral (!) 141 20 96 %   03/16/24 0633 136/65 -- -- -- -- --   03/16/24 0544 109/69 -- -- (!) 145 -- 94 %   03/16/24 0514 125/80 -- -- -- -- --   03/16/24 0500 (!) 150/98 -- -- (!) 157 -- 95 %   03/16/24 0450 133/89 -- -- (!) 130 -- 95 %   03/16/24 0441 (!) 131/94 98.3 °F (36.8 °C) Oral (!) 139 20 96 %   03/16/24 0413 121/87 -- -- (!) 135 -- 96 %   03/16/24 0319 134/90 -- -- (!) 152 20 93 %   03/16/24 0012 -- -- -- 99 18 --   03/15/24 2345 155/73 98.5 °F (36.9 °C) Oral 94 20 94 %   03/15/24 2000 -- -- -- 101 20 96 %   03/15/24 1947 155/78 98.2 °F (36.8 °C) Oral 96 20 96 %   03/15/24 1700 153/65 97.8 °F (36.6 °C) Oral 97 20 91 %   03/15/24 1552 -- -- -- 93 18 94 %     O2 requirement: 4L  Wt Readings from Last 3 Encounters:   03/13/24 172 lb 9.9 oz (78.3 kg)        I/O last 3 completed shifts:  In: 1964 [P.O.:1554; I.V.:210; IV PIGGYBACK:200]  Out: 3850 [Urine:3850]  I/O this shift:  In: 356 [P.O.:356]  Out: 0      Physical  Exam:                          General: alert, cooperative, in NAD                          HEENT: oropharynx clear without erythema or exudates, moist mucous membranes                          Lungs: expiratory wheeze                          Chest wall: No tenderness or deformity.                          Heart: tachycardic, irregular                          Abdomen: soft, non-tender, non-distended, positive BS.                          Extremity: No clubbing or cyanosis. no edema                          Skin: No rashes or lesions.       Lab Results   Component Value Date    WBC 14.5 03/16/2024    RBC 3.86 03/16/2024    HGB 12.4 03/16/2024    HCT 36.8 03/16/2024    MCV 95.3 03/16/2024    MCH 32.1 03/16/2024    MCHC 33.7 03/16/2024    RDW 16.1 03/16/2024    .0 03/16/2024     Lab Results   Component Value Date     03/16/2024    K 4.0 03/16/2024     03/16/2024    CO2 23.0 03/16/2024    BUN 30 03/16/2024    CREATSERUM 0.91 03/16/2024     03/16/2024    CA 8.7 03/16/2024     Lab Results   Component Value Date     03/16/2024    K 4.0 03/16/2024     03/16/2024    CO2 23.0 03/16/2024    BUN 30 03/16/2024    CREATSERUM 0.91 03/16/2024     03/16/2024    CA 8.7 03/16/2024    ALKPHO 117 03/16/2024     03/16/2024     03/16/2024    BILT 0.6 03/16/2024    ALB 2.9 03/16/2024    TP 6.4 03/16/2024     No results found for: \"PT\", \"INR\"       Imaging: I have independently visualized all relevant chest imaging in PACS.  I agree with the radiology interpretation except where noted.                        3/15/24                                                               3/13/24       CTA 3/13/24    ASSESSMENT/PLAN:  Acute hypoxemic respiratory failure: multifactorial with AECOPD in setting of influenza with associated superimposed bacterial PNA, A.Fib with RVR, and some pulmonary edema all contributing  -wean O2 as able, currently on 1L  -CTA negative for PE, but with    -diuresis   -rate control per cards  -treatment of AECOPD, PNA as below  AECOPD:   -trigger: influenza  -prednisone  -start trelegy 100 in lieu of home symbicort/spiriva  -BD protocol  PNA: concern for aspiration given emesis while in CT  -PCT significantly elevated  -sputum culture with normal respiratory richar  -unasyn  Influenza A:  -tamiflu  A.Fib with RVR/pulm edema  -rate control per cards  -diuresis  -repeat CXR with improvement in perihilar opacities  Dispo:  -will follow     Tiffany Ortiz MD  3/16/2024  3:28 PM

## 2024-03-16 NOTE — PLAN OF CARE
Pt still wheezing with non-productive cough, denies any shortness of breath. Only 1L of O2 per NC. Neb treatments around the clock. Irregular heart rate with rapid rate, unable to titrate down Cardizem drip, which is at 15mg/h. Heart rate improves when given scheduled metoprolol IV. BP maintained WNL and pt is asymptomatic. Heparin drip infusing with therapeutic PTT level.  at bedside updated with plan of care.

## 2024-03-16 NOTE — CONSULTS
Francisco/Jason Report of Consultation      Ella Berry Patient Status:  Inpatient    1945 MRN [id number]   Location [facility] 2NE-A Attending Erin Beckford MD   Hosp Day # 3 PCP None Pcp     Reason for Consultation     AF with RVR    Assessment/Plan   AF with RVR, echo with normal LVEF  LBBB  Influennza  Pneumonia  HTN    Can stop heparin, start DOAC  Rate control with cardizem  Consider tikosyn and DCCV, QTc in SR seemed acceptable, however current med list seems to carry risk of drug interactions  Tamiflu, nebs, BiPAP        LEVEL 5  History of Present Illness:   Ella Berry is a a(n) 79 year old female presents with shortness of breath, fatigue, noted to have influenza. While on tele, went into AF with RVR.     History:  Past Medical History:   Diagnosis Date    COPD (chronic obstructive pulmonary disease) (HCC)     Essential hypertension     Left bundle branch block     Metabolic encephalopathy     Pneumonia      History reviewed. No pertinent surgical history.  History reviewed. No pertinent family history.   reports that she has never smoked. She has never been exposed to tobacco smoke. She has never used smokeless tobacco. She reports that she does not drink alcohol and does not use drugs.    Allergies:  Allergies   Allergen Reactions    Erythromycin UNKNOWN       Medications:    Current Facility-Administered Medications:     melatonin cap/tab 5 mg, 5 mg, Oral, Nightly PRN    metoprolol (Lopressor) 5 mg/5mL injection 5 mg, 5 mg, Intravenous, Q6H    adenosine (Adenocard) 6 mg/2mL injection 6 mg, 6 mg, Intravenous, Once    adenosine (Adenocard) 6 mg/2mL injection 12 mg, 12 mg, Intravenous, Once    [COMPLETED] dilTIAZem 5 mg BOLUS FROM BAG infusion, 5 mg, Intravenous, Once **AND** dilTIAZem (cardIZEM) 100 mg in sodium chloride 0.9% 100 mL IVPB-ADDV, 2.5-20 mg/hr, Intravenous, Continuous    heparin (Porcine) 10566 units/250mL infusion ACS/AFIB CONTINUOUS, 200-3,000 Units/hr,  Intravenous, Continuous    predniSONE (Deltasone) tab 40 mg, 40 mg, Oral, Daily with breakfast    ampicillin-sulbactam (Unasyn) 3 g in sodium chloride 0.9% 100mL IVPB-ADD, 3 g, Intravenous, q6h    sodium chloride (Saline Mist) 0.65 % nasal solution 1 spray, 1 spray, Each Nare, Q3H PRN    ipratropium-albuterol (Duoneb) 0.5-2.5 (3) MG/3ML inhalation solution 3 mL, 3 mL, Nebulization, 6 times per day    gabapentin (Neurontin) cap 300 mg, 300 mg, Oral, Noon    gabapentin (Neurontin) tab 600 mg, 600 mg, Oral, Nightly    pramipexole (Mirapex) tab 0.125 mg, 0.125 mg, Oral, 5x Daily    levothyroxine (Synthroid) tab 88 mcg, 88 mcg, Oral, Before breakfast    ondansetron (Zofran) 4 MG/2ML injection 4 mg, 4 mg, Intravenous, Q6H PRN    metoclopramide (Reglan) 5 mg/mL injection 10 mg, 10 mg, Intravenous, Q8H PRN    oseltamivir (Tamiflu) 6 MG/ML oral suspension 30 mg, 30 mg, Oral, Q12H    pantoprazole (Protonix) 40 mg in sodium chloride 0.9% PF 10 mL IV push, 40 mg, Intravenous, QAM AC **OR** pantoprazole (Protonix) DR tab 40 mg, 40 mg, Oral, QAM AC    Review of Systems:  Denies abdominal pain, N/V. No neurologic sx such as focal weakness or paresthesias. No cough. No visual disturbance. No fevers, chills.    Physical Exam:  Blood pressure 128/88, pulse 102, temperature 97.7 °F (36.5 °C), temperature source Oral, resp. rate 20, height 61\", weight 172 lb 9.9 oz (78.3 kg), SpO2 95%.  Temp (24hrs), Av.1 °F (36.7 °C), Min:97.7 °F (36.5 °C), Max:98.5 °F (36.9 °C)    Wt Readings from Last 3 Encounters:   24 172 lb 9.9 oz (78.3 kg)       Telemetry: AF  General: Alert and oriented in no apparent distress.  HEENT: No focal deficits.  Neck: No JVD, carotids 2+ no bruits.  Cardiac: Regular rate and rhythm, S1, S2 normal, no murmur, rub or gallop.  Lungs: Clear without wheezes, rales, rhonchi or dullness.  Normal excursions and effort.  Abdomen: Soft, non-tender.   Extremities: Without clubbing, cyanosis or edema.  Peripheral pulses  are 2+.  Neurologic: Alert and oriented, normal affect.  Skin: Warm and dry.     Laboratories and Data:  Lab Results   Component Value Date    WBC 14.5 03/16/2024    RBC 3.86 03/16/2024    HGB 12.4 03/16/2024    HCT 36.8 03/16/2024    MCV 95.3 03/16/2024    MCH 32.1 03/16/2024    MCHC 33.7 03/16/2024    RDW 16.1 03/16/2024    .0 03/16/2024     BUN (mg/dL)   Date Value   03/16/2024 30 (H)   03/15/2024 31 (H)   03/14/2024 22     Creatinine (mg/dL)   Date Value   03/16/2024 0.91   03/15/2024 1.02   03/14/2024 1.13 (H)     No results found for: \"PT\", \"INR\"    Patient Active Problem List   Diagnosis    Metabolic alkalosis    COPD exacerbation (HCC)    Hypoxia    Influenza    Congestive heart failure, unspecified HF chronicity, unspecified heart failure type (HCC)    Morbid obesity (HCC)    Hyponatremia    SVT (supraventricular tachycardia)         Rand Douglass MD  3/16/2024  10:15 AM

## 2024-03-16 NOTE — PLAN OF CARE
Ella Berry Patient Status:  Inpatient    1945 MRN [id number]   Location [facility] 2NE-A Attending Erin Beckford MD   Hosp Day # 3 PCP None Pcp       Cardiology Nocturnal APN Note    Page Received: Dr. Russell, Hospitalist    HPI:     Patient is a 79 year old female with PMH of HTN and COPD who was admitted on 3/13/2024 for acute respiratory failure 2/2 influenza. Early this morning pt converted to narrow complex tachycardia with rate in the 150s that was suspected to be SVT. Patient was given IV Cardizem and lopressor with no improvement in heart rate. Patient was then given 6 mg of adenosine with no change and then 12 mg of adenosine was given. Initially pt's HR improved, however, pt's HR increased again. Digoxin 0.25 mcg IV was given and HR dropped to the 130s. [facility] consulted for new onset atrial fibrillation with RVR.        Assessment/Plan:    - Transferred to telemetry unit  - Cardizem bolus and IV infusion. Titrate IV infusion for heart rate control as appropriate  - CHADS-VASc = 4 IV heparin for afib protocol initiated  - Formal cardiology consult to follow in AM.         LESTER Karimi  [facility]  3/16/2024  4:57 AM

## 2024-03-17 LAB
ALBUMIN SERPL-MCNC: 3 G/DL (ref 3.4–5)
ALBUMIN/GLOB SERPL: 0.9 {RATIO} (ref 1–2)
ALP LIVER SERPL-CCNC: 111 U/L
ALT SERPL-CCNC: 284 U/L
ANION GAP SERPL CALC-SCNC: 5 MMOL/L (ref 0–18)
APTT PPP: 54.7 SECONDS (ref 23.3–35.6)
AST SERPL-CCNC: 92 U/L (ref 15–37)
BASOPHILS # BLD AUTO: 0.01 X10(3) UL (ref 0–0.2)
BASOPHILS NFR BLD AUTO: 0.1 %
BILIRUB SERPL-MCNC: 0.6 MG/DL (ref 0.1–2)
BUN BLD-MCNC: 18 MG/DL (ref 9–23)
CALCIUM BLD-MCNC: 9.3 MG/DL (ref 8.5–10.1)
CHLORIDE SERPL-SCNC: 108 MMOL/L (ref 98–112)
CO2 SERPL-SCNC: 25 MMOL/L (ref 21–32)
CREAT BLD-MCNC: 0.86 MG/DL
EGFRCR SERPLBLD CKD-EPI 2021: 70 ML/MIN/1.73M2 (ref 60–?)
EOSINOPHIL # BLD AUTO: 0.03 X10(3) UL (ref 0–0.7)
EOSINOPHIL NFR BLD AUTO: 0.4 %
ERYTHROCYTE [DISTWIDTH] IN BLOOD BY AUTOMATED COUNT: 16.2 %
GLOBULIN PLAS-MCNC: 3.5 G/DL (ref 2.8–4.4)
GLUCOSE BLD-MCNC: 153 MG/DL (ref 70–99)
HCT VFR BLD AUTO: 36.7 %
HGB BLD-MCNC: 12 G/DL
IMM GRANULOCYTES # BLD AUTO: 0.05 X10(3) UL (ref 0–1)
IMM GRANULOCYTES NFR BLD: 0.6 %
LYMPHOCYTES # BLD AUTO: 1.34 X10(3) UL (ref 1–4)
LYMPHOCYTES NFR BLD AUTO: 16 %
MCH RBC QN AUTO: 32.6 PG (ref 26–34)
MCHC RBC AUTO-ENTMCNC: 32.7 G/DL (ref 31–37)
MCV RBC AUTO: 99.7 FL
MONOCYTES # BLD AUTO: 0.63 X10(3) UL (ref 0.1–1)
MONOCYTES NFR BLD AUTO: 7.5 %
NEUTROPHILS # BLD AUTO: 6.34 X10 (3) UL (ref 1.5–7.7)
NEUTROPHILS # BLD AUTO: 6.34 X10(3) UL (ref 1.5–7.7)
NEUTROPHILS NFR BLD AUTO: 75.4 %
OSMOLALITY SERPL CALC.SUM OF ELEC: 291 MOSM/KG (ref 275–295)
PLATELET # BLD AUTO: 160 10(3)UL (ref 150–450)
POTASSIUM SERPL-SCNC: 4.1 MMOL/L (ref 3.5–5.1)
PROT SERPL-MCNC: 6.5 G/DL (ref 6.4–8.2)
RBC # BLD AUTO: 3.68 X10(6)UL
SODIUM SERPL-SCNC: 138 MMOL/L (ref 136–145)
WBC # BLD AUTO: 8.4 X10(3) UL (ref 4–11)

## 2024-03-17 PROCEDURE — 99232 SBSQ HOSP IP/OBS MODERATE 35: CPT | Performed by: HOSPITALIST

## 2024-03-17 RX ORDER — METOPROLOL TARTRATE 50 MG/1
50 TABLET, FILM COATED ORAL
Status: DISCONTINUED | OUTPATIENT
Start: 2024-03-17 | End: 2024-03-17

## 2024-03-17 RX ORDER — METOPROLOL TARTRATE 100 MG/1
100 TABLET ORAL
Status: DISCONTINUED | OUTPATIENT
Start: 2024-03-17 | End: 2024-03-20

## 2024-03-17 NOTE — PROGRESS NOTES
Pt  alert and o x 4 , on mod high back rest , still wheezing with non-productive cough, denies any shortness of breath. Only 1L of O2 per NC. Neb treatments around the clock. Still Afib on the monitor , 's to 130's . Cardizem drip, which is at 15mg/hr. BP > 200's mulptiple times , MD notified.   Scheduled metoprolol IV given early . Still on Heparin gtt per ACS/Afib protocol. Pt. not in any form of distress. Cont. Monitor per tele/labs/v/s. Meds as ordered. Fall/safety precautions instructed , placed call light w/in reach. Maintained on  droplet isolation as indicated.    0630  decrease Cardizem gtt to 10 mg/hr

## 2024-03-17 NOTE — PROGRESS NOTES
[facility]    Ella Berry Patient Status:  Inpatient    1945 MRN [id number]   Location [facility] 2NE-A Attending Erin Beckford MD   Hosp Day # 4 PCP None Pcp     SUBJECTIVE: Pt continues to have some wheezing and SOB    OBJECTIVE:  /70 (BP Location: Left arm)   Pulse 115   Temp 97.7 °F (36.5 °C) (Oral)   Resp 22   Ht 5' 1\" (1.549 m)   Wt 172 lb 9.9 oz (78.3 kg)   SpO2 94%   BMI 32.62 kg/m²   O2 requirement: RA     I/O last 3 completed shifts:  In: 686 [P.O.:476; I.V.:210]  Out: 2850 [Urine:2850]  No intake/output data recorded.     Current Medications:   Current Facility-Administered Medications:     apixaban (Eliquis) tab 5 mg, 5 mg, Oral, BID    metoprolol tartrate (Lopressor) tab 50 mg, 50 mg, Oral, 2x Daily(Beta Blocker)    melatonin cap/tab 5 mg, 5 mg, Oral, Nightly PRN    [Held by provider] metoprolol (Lopressor) 5 mg/5mL injection 5 mg, 5 mg, Intravenous, Q6H    adenosine (Adenocard) 6 mg/2mL injection 6 mg, 6 mg, Intravenous, Once    adenosine (Adenocard) 6 mg/2mL injection 12 mg, 12 mg, Intravenous, Once    [COMPLETED] dilTIAZem 5 mg BOLUS FROM BAG infusion, 5 mg, Intravenous, Once **AND** dilTIAZem (cardIZEM) 100 mg in sodium chloride 0.9% 100 mL IVPB-ADDV, 2.5-20 mg/hr, Intravenous, Continuous    metoclopramide (Reglan) 5 mg/mL injection 5 mg, 5 mg, Intravenous, Q8H PRN    fluticasone-umeclidin-vilant (Trelegy Ellipta) 100-62.5-25 MCG/ACT inhaler 1 puff, 1 puff, Inhalation, Daily    labetalol (Trandate) 5 mg/mL injection 10 mg, 10 mg, Intravenous, Q4H PRN    predniSONE (Deltasone) tab 40 mg, 40 mg, Oral, Daily with breakfast    ampicillin-sulbactam (Unasyn) 3 g in sodium chloride 0.9% 100mL IVPB-ADD, 3 g, Intravenous, q6h    sodium chloride (Saline Mist) 0.65 % nasal solution 1 spray, 1 spray, Each Nare, Q3H PRN    ipratropium-albuterol (Duoneb) 0.5-2.5 (3) MG/3ML inhalation solution 3 mL, 3 mL, Nebulization, 6 times per day    gabapentin (Neurontin) cap 300 mg, 300  mg, Oral, Noon    gabapentin (Neurontin) tab 600 mg, 600 mg, Oral, Nightly    pramipexole (Mirapex) tab 0.125 mg, 0.125 mg, Oral, 5x Daily    levothyroxine (Synthroid) tab 88 mcg, 88 mcg, Oral, Before breakfast    ondansetron (Zofran) 4 MG/2ML injection 4 mg, 4 mg, Intravenous, Q6H PRN    oseltamivir (Tamiflu) 6 MG/ML oral suspension 30 mg, 30 mg, Oral, Q12H    pantoprazole (Protonix) 40 mg in sodium chloride 0.9% PF 10 mL IV push, 40 mg, Intravenous, QAM AC **OR** pantoprazole (Protonix) DR tab 40 mg, 40 mg, Oral, QAM AC     Physical Exam:                          General: alert, cooperative, in NAD                          HEENT: oropharynx clear without erythema or exudates, moist mucous membranes                          Lungs: scattered expiratory wheeze                          Chest wall: No tenderness or deformity.                          Heart: Regular rate and rhythm, normal S1S2                          Abdomen: soft, non-tender, non-distended, positive BS.                          Extremity: No clubbing or cyanosis. no edema                          Skin: No rashes or lesions.       Lab Results   Component Value Date    WBC 8.4 03/17/2024    RBC 3.68 03/17/2024    HGB 12.0 03/17/2024    HCT 36.7 03/17/2024    MCV 99.7 03/17/2024    MCH 32.6 03/17/2024    MCHC 32.7 03/17/2024    RDW 16.2 03/17/2024    .0 03/17/2024     Lab Results   Component Value Date     03/17/2024    K 4.1 03/17/2024     03/17/2024    CO2 25.0 03/17/2024    BUN 18 03/17/2024    CREATSERUM 0.86 03/17/2024     03/17/2024    CA 9.3 03/17/2024    ALKPHO 111 03/17/2024     03/17/2024    AST 92 03/17/2024    BILT 0.6 03/17/2024    ALB 3.0 03/17/2024    TP 6.5 03/17/2024     No results found for: \"PT\", \"INR\"       Imaging: I have independently visualized all relevant chest imaging in PACS.  I agree with the radiology interpretation except where noted.                      3/15/24                                              3/13/24        CTA 3/13/24    ASSESSMENT/PLAN:  Acute hypoxemic respiratory failure: multifactorial with AECOPD in setting of influenza with associated superimposed bacterial PNA, A.Fib with RVR, and some pulmonary edema all contributing  -wean O2 as able, currently on RA  -CTA negative for PE, but with GGO with patchy consolidation and interlobular septal thickening   -diuresis   -rate control per cards  -treatment of AECOPD, PNA as below  AECOPD:   -trigger: influenza  -prednisone  -start trelegy 100 in lieu of home symbicort/spiriva  -BD protocol  PNA: concern for aspiration given emesis while in CT  -PCT significantly elevated  -sputum culture with normal respiratory richar  -unasyn (3/15 -  )  Influenza A:  -tamiflu  A.Fib with RVR/pulm edema  -rate control per cards  -diuresis  -repeat CXR with improvement in perihilar opacities  -now on eliquis  Dispo:  -will follow     Albina Ortiz MD  3/17/2024  1:35 PM

## 2024-03-17 NOTE — PROGRESS NOTES
Progress Note  Ella Berry Patient Status:  Inpatient    1945 MRN [id number]   Location [facility] 2NE-A Attending Erin Beckford MD   Hosp Day # 4 PCP None Pcp     Subjective:  Remains AF with moderately elevated rates.     Objective:  /70 (BP Location: Left arm)   Pulse 103   Temp 97.6 °F (36.4 °C) (Oral)   Resp 22   Ht 5' 1\" (1.549 m)   Wt 172 lb 9.9 oz (78.3 kg)   SpO2 94%   BMI 32.62 kg/m²     Telemetry:  BPM.       Intake/Output:    Intake/Output Summary (Last 24 hours) at 3/17/2024 0644  Last data filed at 3/17/2024 0407  Gross per 24 hour   Intake 476 ml   Output 2050 ml   Net -1574 ml       Last 3 Weights   24 172 lb 9.9 oz (78.3 kg)   24 1030 160 lb (72.6 kg)       Labs:  Recent Labs   Lab 03/15/24  0400 03/15/24  1622 24  0506 24  1516   *  --  148* 210*   BUN 31*  --  30* 26*   CREATSERUM 1.02  --  0.91 0.94   EGFRCR 57*  --  65 62   CA 8.9  --  8.7 8.8   *  --  133* 132*   K 3.4* 4.0 4.0 4.4     --  106 104   CO2 23.0  --  23.0 23.0     Recent Labs   Lab 24  0438 03/15/24  0400 24  0506   RBC 4.06 3.54* 3.86   HGB 13.1 11.3* 12.4   HCT 40.1 34.7* 36.8   MCV 98.8 98.0 95.3   MCH 32.3 31.9 32.1   MCHC 32.7 32.6 33.7   RDW 15.8 16.1 16.1   NEPRELIM 17.30* 15.61* 12.83*   WBC 18.3* 16.4* 14.5*   .0 188.0 203.0         Recent Labs   Lab 24  1049   TROPHS 10   EKG:  3/16/24:  AF LBBB 103 BPM, QRS: 122 ms, QTc: 491 ms.   Echo:  3/14/24:  1. Left ventricle: The cavity size was normal. Wall thickness was mildly      increased. Systolic function was vigorous. The estimated ejection      fraction was 65-70%, by visual assessment. No diagnostic evidence for      regional wall motion abnormalities. Unable to assess LV diastolic      function.   2. Left atrium: The atrium was mildly dilated.   3. Aortic valve: There was mild regurgitation.   4. Mitral valve: The annulus was mildly to moderately calcified.    5. Pulmonary arteries: Systolic pressure was mildly increased, in the range      of 30mm Hg to 35mm Hg.   Impressions:  No previous study from [facility] was   available for comparison.     Review of Systems:   Constitutional: No fevers, chills, fatigue or night sweats.  ENT: No mouth pain, neck pain, running nose, headaches or swollen glands.  Skin: No rashes, pruritus or skin changes,  Respiratory: Positive RAMÍREZ.   CV: Denies chest pain, palpitations, orthopnea, PND or dizziness.  Musculoskeletal: No joint pain, stiffness or swelling.  GI: No nausea, vomiting or diarrhea. No blood in stools.  Neurologic: No seizures, tremors, weakness or numbness.     Physical Exam:  Gen: alert, oriented x 3, NAD  Heent: pupils equal, reactive. Mucous membranes moist.   Neck: no jvd  Cardiac: Irregular rate and rhythm, normal S1,S2, no murmur, gallop or rub.   Lungs: Clear upper, Dim bases. NP cough.  Abd: soft, NT/ND +bs  Ext: no edema  Skin: Warm, dry  Neuro: No focal deficits    Medications:     metoprolol  5 mg Intravenous Q6H    adenosine  6 mg Intravenous Once    adenosine  12 mg Intravenous Once    fluticasone-umeclidin-vilant  1 puff Inhalation Daily    predniSONE  40 mg Oral Daily with breakfast    ampicillin-sulbactam  3 g Intravenous q6h    ipratropium-albuterol  3 mL Nebulization 6 times per day    gabapentin  300 mg Oral Noon    gabapentin  600 mg Oral Nightly    pramipexole  0.125 mg Oral 5x Daily    levothyroxine  88 mcg Oral Before breakfast    oseltamivir  30 mg Oral Q12H    pantoprazole  40 mg Intravenous QAM AC    Or    pantoprazole  40 mg Oral QAM AC      dilTIAZem 10 mg/hr (03/17/24 0631)    continuous dose heparin       Assessment:  Acute hypoxic respiratory failure/Influenza A/PNA:    Tamiflu, steroids.   3/16:CXR shows improvement   New onset AF:    LVEF:  65-70 %, PAP02: 30-35 mmHg.   Moderate rate control- improving.   Start Metoprolol Tartrate 50 mg Bid. Historically on Toprol 200 mg  every day.  Wean off Dilitazem Gtt.   Dariel: 3 points. Start Eliquis 5 mg  Bid this am and stop heparin Gtt.   HTN:  Controlled.   Chronic LBBB.   COPD    Plan:  Remains AF with moderate rate control.   Transition off Diltiazem Gtt and to po Metoprolol.   Start Eliquis and stop Heparin.   Await BMP this am.     Plan of care discussed with patient, RN.    Pauly Perez, APRN  3/17/2024  6:44 AM  [phone]  [facility] [phone]        Chart reveiwed and decision making performed in entirety with discussion with staff. Agree with above note and assessment with the following additions made below. General: No acute distress.   HEENT- NCAT,   CVS- normal S1, S2  Lungs- clear bilaterally.   Abdomen- soft, non-tender.  Extremities- Equal pulses, no edema.   Continue current medications.   Consider DCCV if rates remain elevated. Continue DOAC    Rand Douglass MD  [facility]  Cardiac Electrophysiolgy

## 2024-03-17 NOTE — PHYSICAL THERAPY NOTE
Attempting PT eval. RN reports pt continues to have difficulty regulating HR and not appropriate for PT eval at this time. Will continue to follow and complete PT eval as pt medically appropriate.

## 2024-03-17 NOTE — PROGRESS NOTES
[facility]   part of [facility]     Hospitalist Progress Note     Ella Berry Patient Status:  Inpatient    1945 MRN [id number]   Location [facility] 2NE-A Attending Erin Beckford MD   Hosp Day # 4 PCP None Pcp     Chief Complaint: shortness of breath    Subjective:     Feeling better today, no cough, shortness of breath improving,  HR remain elevated but improved    Objective:    Review of Systems:   A comprehensive review of systems was completed; pertinent positive and negatives stated in subjective.    Vital signs:  Temp:  [97.5 °F (36.4 °C)-98.2 °F (36.8 °C)] 97.7 °F (36.5 °C)  Pulse:  [] 115  Resp:  [22-24] 22  BP: (134-188)/() 141/70  SpO2:  [94 %-99 %] 94 %    Physical Exam:    General: No acute distress  Respiratory: No wheezes, no rhonchi  Cardiovascular: S1, S2, regular rate and rhythm  Abdomen: Soft, Non-tender, non-distended, positive bowel sounds  Neuro: No new focal deficits.   Extremities: No edema      Diagnostic Data:    Labs:  Recent Labs   Lab 24  1049 24  0438 03/15/24  0400 24  0506 24  0727   WBC 9.3 18.3* 16.4* 14.5* 8.4   HGB 13.4 13.1 11.3* 12.4 12.0   .0* 98.8 98.0 95.3 99.7   .0 221.0 188.0 203.0 160.0       Recent Labs   Lab 03/15/24  0400 03/15/24  1622 24  0506 24  1516 24  0727   *  --  148* 210* 153*   BUN 31*  --  30* 26* 18   CREATSERUM 1.02  --  0.91 0.94 0.86   CA 8.9  --  8.7 8.8 9.3   ALB 2.8*  --  2.9*  --  3.0*   *  --  133* 132* 138   K 3.4*   < > 4.0 4.4 4.1     --  106 104 108   CO2 23.0  --  23.0 23.0 25.0   ALKPHO 108  --  117  --  111   *  --  174*  --  92*   *  --  370*  --  284*   BILT 0.5  --  0.6  --  0.6   TP 5.9*  --  6.4  --  6.5    < > = values in this interval not displayed.       Estimated Creatinine Clearance: 41.3 mL/min (based on SCr of 0.86 mg/dL).    Recent Labs   Lab 24  1049   TROPHS 10       No results for input(s):  \"PTP\", \"INR\" in the last 168 hours.         Microbiology    Hospital Encounter on 03/13/24   1. Blood Culture     Status: None (Preliminary result)    Collection Time: 03/13/24 11:06 AM    Specimen: Blood,peripheral   Result Value Ref Range    Blood Culture Result No Growth 3 Days N/A         Imaging: Reviewed in Epic.    Medications:    apixaban  5 mg Oral BID    metoprolol tartrate  50 mg Oral 2x Daily(Beta Blocker)    [Held by provider] metoprolol  5 mg Intravenous Q6H    adenosine  6 mg Intravenous Once    adenosine  12 mg Intravenous Once    fluticasone-umeclidin-vilant  1 puff Inhalation Daily    predniSONE  40 mg Oral Daily with breakfast    ampicillin-sulbactam  3 g Intravenous q6h    ipratropium-albuterol  3 mL Nebulization 6 times per day    gabapentin  300 mg Oral Noon    gabapentin  600 mg Oral Nightly    pramipexole  0.125 mg Oral 5x Daily    levothyroxine  88 mcg Oral Before breakfast    oseltamivir  30 mg Oral Q12H    pantoprazole  40 mg Intravenous QAM AC    Or    pantoprazole  40 mg Oral QAM AC       Assessment & Plan:      #New on set A.fib with RVR  -started on oral BB, transition off cardizem gtt as able  -heparin dced, started on eliquis  -Tele monitoring  -ECHO reviewed from 3/14    #Acute COPD exacerbation  #Influenza  -tamiflu  -steroids  -nebs    # YAMILET    # Elevated LFTs  -improving         Erin Beckford MD    Supplementary Documentation:     Quality:  DVT Mechanical Prophylaxis:   SCDs,    DVT Pharmacologic Prophylaxis   Medication    apixaban (Eliquis) tab 5 mg                Code Status: Full Code  Wylie: External urinary catheter in place  Wylie Duration (in days):   Central line:    STORM: 3/18/2024    Discharge is dependent on: progress  At this point Ms. Berry is expected to be discharge to: home    The 21st Century Cures Act makes medical notes like these available to patients in the interest of transparency. Please be advised this is a medical document. Medical documents are  intended to carry relevant information, facts as evident, and the clinical opinion of the practitioner. The medical note is intended as peer to peer communication and may appear blunt or direct. It is written in medical language and may contain abbreviations or verbiage that are unfamiliar.

## 2024-03-17 NOTE — PLAN OF CARE
Assumed care of pt around 0730  AxO x4, up x1 assist  1 L NC, expiratory wheezes, strong non-productive cough  A fib on tele, HR controlled  -weaning cardizem gtt as able  Heparin gtt stopped and eliquis started per order  Plan: Nebs scheduled, PO steroid, IV abx  Continent of bowel and bladder  Fall precautions in place  Pt updated on plan of care, all needs met at this time          Problem: Patient/Family Goals  Goal: Patient/Family Long Term Goal  Description: Patient's Long Term Goal: DC to home     Interventions:  - follow plan of care        - See additional Care Plan goals for specific interventions  Outcome: Progressing  Goal: Patient/Family Short Term Goal  Description: Patient's Short Term Goal: get better soon     Interventions:   - meds    - See additional Care Plan goals for specific interventions  Outcome: Progressing     Problem: CARDIOVASCULAR - ADULT  Goal: Maintains optimal cardiac output and hemodynamic stability  Description: INTERVENTIONS:  - Monitor vital signs, rhythm, and trends  - Monitor for bleeding, hypotension and signs of decreased cardiac output  - Evaluate effectiveness of vasoactive medications to optimize hemodynamic stability  - Monitor arterial and/or venous puncture sites for bleeding and/or hematoma  - Assess quality of pulses, skin color and temperature  - Assess for signs of decreased coronary artery perfusion - ex. Angina  - Evaluate fluid balance, assess for edema, trend weights  Outcome: Progressing  Goal: Absence of cardiac arrhythmias or at baseline  Description: INTERVENTIONS:  - Continuous cardiac monitoring, monitor vital signs, obtain 12 lead EKG if indicated  - Evaluate effectiveness of antiarrhythmic and heart rate control medications as ordered  - Initiate emergency measures for life threatening arrhythmias  - Monitor electrolytes and administer replacement therapy as ordered  Outcome: Progressing     Problem: RESPIRATORY - ADULT  Goal: Achieves optimal  ventilation and oxygenation  Description: INTERVENTIONS:  - Assess for changes in respiratory status  - Assess for changes in mentation and behavior  - Position to facilitate oxygenation and minimize respiratory effort  - Oxygen supplementation based on oxygen saturation or ABGs  - Provide Smoking Cessation handout, if applicable  - Encourage broncho-pulmonary hygiene including cough, deep breathe, Incentive Spirometry  - Assess the need for suctioning and perform as needed  - Assess and instruct to report SOB or any respiratory difficulty  - Respiratory Therapy support as indicated  - Manage/alleviate anxiety  - Monitor for signs/symptoms of CO2 retention  Outcome: Progressing     Problem: GASTROINTESTINAL - ADULT  Goal: Minimal or absence of nausea and vomiting  Description: INTERVENTIONS:  - Maintain adequate hydration with IV or PO as ordered and tolerated  - Nasogastric tube to low intermittent suction as ordered  - Evaluate effectiveness of ordered antiemetic medications  - Provide nonpharmacologic comfort measures as appropriate  - Advance diet as tolerated, if ordered  - Obtain nutritional consult as needed  - Evaluate fluid balance  Outcome: Progressing  Goal: Maintains or returns to baseline bowel function  Description: INTERVENTIONS:  - Assess bowel function  - Maintain adequate hydration with IV or PO as ordered and tolerated  - Evaluate effectiveness of GI medications  - Encourage mobilization and activity  - Obtain nutritional consult as needed  - Establish a toileting routine/schedule  - Consider collaborating with pharmacy to review patient's medication profile  Outcome: Progressing     Problem: GENITOURINARY - ADULT  Goal: Absence of urinary retention  Description: INTERVENTIONS:  - Assess patient’s ability to void and empty bladder  - Monitor intake/output and perform bladder scan as needed  - Follow urinary retention protocol/standard of care  - Consider collaborating with pharmacy to review  patient's medication profile  - Implement strategies to promote bladder emptying  Outcome: Progressing     Problem: METABOLIC/FLUID AND ELECTROLYTES - ADULT  Goal: Glucose maintained within prescribed range  Description: INTERVENTIONS:  - Monitor Blood Glucose as ordered  - Assess for signs and symptoms of hyperglycemia and hypoglycemia  - Administer ordered medications to maintain glucose within target range  - Assess barriers to adequate nutritional intake and initiate nutrition consult as needed  - Instruct patient on self management of diabetes  Outcome: Progressing  Goal: Electrolytes maintained within normal limits  Description: INTERVENTIONS:  - Monitor labs and rhythm and assess patient for signs and symptoms of electrolyte imbalances  - Administer electrolyte replacement as ordered  - Monitor response to electrolyte replacements, including rhythm and repeat lab results as appropriate  - Fluid restriction as ordered  - Instruct patient on fluid and nutrition restrictions as appropriate  Outcome: Progressing  Goal: Hemodynamic stability and optimal renal function maintained  Description: INTERVENTIONS:  - Monitor labs and assess for signs and symptoms of volume excess or deficit  - Monitor intake, output and patient weight  - Monitor urine specific gravity, serum osmolarity and serum sodium as indicated or ordered  - Monitor response to interventions for patient's volume status, including labs, urine output, blood pressure (other measures as available)  - Encourage oral intake as appropriate  - Instruct patient on fluid and nutrition restrictions as appropriate  Outcome: Progressing     Problem: HEMATOLOGIC - ADULT  Goal: Free from bleeding injury  Description: (Example usage: patient with low platelets)  INTERVENTIONS:  - Avoid intramuscular injections, enemas and rectal medication administration  - Ensure safe mobilization of patient  - Hold pressure on venipuncture sites to achieve adequate hemostasis  -  Assess for signs and symptoms of internal bleeding  - Monitor lab trends  - Patient is to report abnormal signs of bleeding to staff  - Avoid use of toothpicks and dental floss  - Use electric shaver for shaving  - Use soft bristle tooth brush  - Limit straining and forceful nose blowing  Outcome: Progressing     Problem: PAIN - ADULT  Goal: Verbalizes/displays adequate comfort level or patient's stated pain goal  Description: INTERVENTIONS:  - Encourage pt to monitor pain and request assistance  - Assess pain using appropriate pain scale  - Administer analgesics based on type and severity of pain and evaluate response  - Implement non-pharmacological measures as appropriate and evaluate response  - Consider cultural and social influences on pain and pain management  - Manage/alleviate anxiety  - Utilize distraction and/or relaxation techniques  - Monitor for opioid side effects  - Notify MD/LIP if interventions unsuccessful or patient reports new pain  - Anticipate increased pain with activity and pre-medicate as appropriate  Outcome: Progressing     Problem: SAFETY ADULT - FALL  Goal: Free from fall injury  Description: INTERVENTIONS:  - Assess pt frequently for physical needs  - Identify cognitive and physical deficits and behaviors that affect risk of falls.  - [place] fall precautions as indicated by assessment.  - Educate pt/family on patient safety including physical limitations  - Instruct pt to call for assistance with activity based on assessment  - Modify environment to reduce risk of injury  - Provide assistive devices as appropriate  - Consider OT/PT consult to assist with strengthening/mobility  - Encourage toileting schedule  Outcome: Progressing

## 2024-03-18 LAB
ANION GAP SERPL CALC-SCNC: 4 MMOL/L (ref 0–18)
ATRIAL RATE: 133 BPM
ATRIAL RATE: 166 BPM
BUN BLD-MCNC: 18 MG/DL (ref 9–23)
CALCIUM BLD-MCNC: 9.1 MG/DL (ref 8.5–10.1)
CHLORIDE SERPL-SCNC: 108 MMOL/L (ref 98–112)
CO2 SERPL-SCNC: 23 MMOL/L (ref 21–32)
CREAT BLD-MCNC: 0.79 MG/DL
EGFRCR SERPLBLD CKD-EPI 2021: 77 ML/MIN/1.73M2 (ref 60–?)
GLUCOSE BLD-MCNC: 139 MG/DL (ref 70–99)
OSMOLALITY SERPL CALC.SUM OF ELEC: 284 MOSM/KG (ref 275–295)
PLATELET # BLD AUTO: 160 10(3)UL (ref 150–450)
POTASSIUM SERPL-SCNC: 4 MMOL/L (ref 3.5–5.1)
Q-T INTERVAL: 310 MS
Q-T INTERVAL: 314 MS
QRS DURATION: 122 MS
QRS DURATION: 124 MS
QTC CALCULATION (BEZET): 491 MS
QTC CALCULATION (BEZET): 505 MS
R AXIS: -63 DEGREES
R AXIS: 234 DEGREES
SODIUM SERPL-SCNC: 135 MMOL/L (ref 136–145)
T AXIS: 124 DEGREES
T AXIS: 42 DEGREES
VENTRICULAR RATE: 147 BPM
VENTRICULAR RATE: 160 BPM

## 2024-03-18 PROCEDURE — 99232 SBSQ HOSP IP/OBS MODERATE 35: CPT | Performed by: HOSPITALIST

## 2024-03-18 RX ORDER — AMOXICILLIN AND CLAVULANATE POTASSIUM 875; 125 MG/1; MG/1
1 TABLET, FILM COATED ORAL 2 TIMES DAILY
Qty: 6 TABLET | Refills: 0 | Status: SHIPPED | OUTPATIENT
Start: 2024-03-18 | End: 2024-03-21

## 2024-03-18 RX ORDER — IPRATROPIUM BROMIDE AND ALBUTEROL SULFATE 2.5; .5 MG/3ML; MG/3ML
3 SOLUTION RESPIRATORY (INHALATION)
Status: DISCONTINUED | OUTPATIENT
Start: 2024-03-18 | End: 2024-03-20

## 2024-03-18 RX ORDER — PREDNISONE 10 MG/1
TABLET ORAL
Qty: 30 TABLET | Refills: 0 | Status: SHIPPED | OUTPATIENT
Start: 2024-03-18

## 2024-03-18 RX ORDER — METOPROLOL TARTRATE 1 MG/ML
5 INJECTION, SOLUTION INTRAVENOUS ONCE
Status: COMPLETED | OUTPATIENT
Start: 2024-03-18 | End: 2024-03-18

## 2024-03-18 RX ORDER — DILTIAZEM HYDROCHLORIDE 180 MG/1
180 CAPSULE, COATED, EXTENDED RELEASE ORAL DAILY
Qty: 90 CAPSULE | Refills: 0 | Status: SHIPPED | OUTPATIENT
Start: 2024-03-19 | End: 2024-03-20

## 2024-03-18 RX ORDER — METOPROLOL TARTRATE 100 MG/1
100 TABLET ORAL
Qty: 180 TABLET | Refills: 0 | Status: SHIPPED | OUTPATIENT
Start: 2024-03-18

## 2024-03-18 RX ORDER — DILTIAZEM HYDROCHLORIDE 180 MG/1
180 CAPSULE, EXTENDED RELEASE ORAL DAILY
Status: DISCONTINUED | OUTPATIENT
Start: 2024-03-19 | End: 2024-03-19

## 2024-03-18 RX ORDER — DILTIAZEM HYDROCHLORIDE 120 MG/1
120 CAPSULE, EXTENDED RELEASE ORAL DAILY
Status: DISCONTINUED | OUTPATIENT
Start: 2024-03-18 | End: 2024-03-18

## 2024-03-18 RX ORDER — DILTIAZEM HYDROCHLORIDE 60 MG/1
60 TABLET, FILM COATED ORAL ONCE
Status: COMPLETED | OUTPATIENT
Start: 2024-03-18 | End: 2024-03-18

## 2024-03-18 RX ORDER — FAMOTIDINE 20 MG/1
20 TABLET, FILM COATED ORAL 2 TIMES DAILY
Qty: 14 TABLET | Refills: 0 | Status: SHIPPED | OUTPATIENT
Start: 2024-03-18

## 2024-03-18 NOTE — PROGRESS NOTES
Progress Note  Ella Berry Patient Status:  Inpatient    1945 MRN [id number]   Location [facility] 2NE-A Attending Erin Beckford MD   Hosp Day # 5 PCP None Pcp     Subjective:  No acute events overnight.  Sitting up in a chair, on room air, complaining of exertional dyspnea, cough, congestion and slight fatigue while ambulating.  Denies any CP, palpitations or dizziness at this time.     Objective:  /90 (BP Location: Left arm)   Pulse 91   Temp 98.3 °F (36.8 °C) (Oral)   Resp 18   Ht 5' 1\" (1.549 m)   Wt 172 lb 9.9 oz (78.3 kg)   SpO2 92%   BMI 32.62 kg/m²     Telemetry: A-fib, HR 90-100s      Intake/Output:    Intake/Output Summary (Last 24 hours) at 3/18/2024 0956  Last data filed at 3/18/2024 0849  Gross per 24 hour   Intake 480 ml   Output 1600 ml   Net -1120 ml       Last 3 Weights   24 2000 172 lb 9.9 oz (78.3 kg)   24 1030 160 lb (72.6 kg)       Labs:  Recent Labs   Lab 24  1516 24  0727 24  0703   * 153* 139*   BUN 26* 18 18   CREATSERUM 0.94 0.86 0.79   EGFRCR 62 70 77   CA 8.8 9.3 9.1   * 138 135*   K 4.4 4.1 4.0    108 108   CO2 23.0 25.0 23.0     Recent Labs   Lab 03/15/24  0400 24  0506 24  0727 24  0703   RBC 3.54* 3.86 3.68*  --    HGB 11.3* 12.4 12.0  --    HCT 34.7* 36.8 36.7  --    MCV 98.0 95.3 99.7  --    MCH 31.9 32.1 32.6  --    MCHC 32.6 33.7 32.7  --    RDW 16.1 16.1 16.2  --    NEPRELIM 15.61* 12.83* 6.34  --    WBC 16.4* 14.5* 8.4  --    .0 203.0 160.0 160.0         Recent Labs   Lab 24  1049   TROPHS 10       Review of Systems   Constitutional: Negative.   HENT:  Positive for congestion.    Cardiovascular:  Positive for dyspnea on exertion and irregular heartbeat.   Respiratory:  Positive for cough and wheezing.        Physical Exam:    Gen: alert, oriented x 3, NAD  Heent: pupils equal, reactive. Mucous membranes moist.   Neck: no jvd  Cardiac: irregular rate and rhythm,  normal S1,S2, no murmur, gallop or rub   Lungs: diminished at the basis, expiratory wheezes in the bilateral upper lobes   Abd: soft, NT/ND + basis   Ext: no edema  Skin: Warm, dry  Neuro: No focal deficits        Medications:     dilTIAZem ER  120 mg Oral Daily    apixaban  5 mg Oral BID    metoprolol tartrate  100 mg Oral 2x Daily(Beta Blocker)    [Held by provider] metoprolol  5 mg Intravenous Q6H    adenosine  6 mg Intravenous Once    adenosine  12 mg Intravenous Once    fluticasone-umeclidin-vilant  1 puff Inhalation Daily    predniSONE  40 mg Oral Daily with breakfast    ampicillin-sulbactam  3 g Intravenous q6h    ipratropium-albuterol  3 mL Nebulization 6 times per day    gabapentin  300 mg Oral Noon    gabapentin  600 mg Oral Nightly    pramipexole  0.125 mg Oral 5x Daily    levothyroxine  88 mcg Oral Before breakfast    pantoprazole  40 mg Intravenous QAM AC    Or    pantoprazole  40 mg Oral QAM AC       Assessment:  Acute hypoxic respiratory failure/Influenza A/PNA    Tamiflu, steroids.   3/16:CXR shows improvement   New onset A-fib RVR on admit, now rate control improved  On PO diltiazem 120 mg, metoprolol 100 mg bid       On eliquis for stroke prevention, [id number] 3.  Echo, LVEF:  65-70 %, PAP02: 30-35 mmHg.   HTN:  Controlled.   Chronic LBBB.   COPD/Asthma-management per primary      Plan:  A-fib, rates control improved-continue PO Diltiazem, metoprolol.  Continue eliquis for stroke prevention.  Continue abx, steroids, nebs-per pulm.  Discharge planning in progress, pending HR control and pulmonary clearance.         Plan of care discussed with patient, RN.    Karly Hernandez, APRN  3/18/2024  9:56 AM  [phone]        Patient seen and examined independently.  Note reviewed and labs reviewed. Agree with above assessment and plan.  79 year old female who presented with acute hypoxic respiratory failure secondary to pneumonia and influenza a.  She also developed new onset atrial fibrillation  with rapid ventricular response and has been on Cardizem infusion.  She is currently at her baseline metoprolol dosage.  Discontinue Cardizem gtt today and add p.o. Cardizem 120 mg daily for liberal HR control which will hopefully be temporary postdischarge.  Clinically monitor the patient and if she is improving with a controlled heart rate, plan for discharge with DOAC anticoagulation.  Overall, would expect improvement in A-fib as her respiratory symptoms improve as well.  Outpatient follow-up will be recommended.      Balta Patel DO  Cardiologist  [facility] [place]  3/18/2024 10:29 AM      Note to the patient: The 21st Century Cures Act makes medical notes like these available to patients in the interest of transparency. However, be advised that this is a medical document. It is intended as peer to peer communication. It is written in medical language and may contain abbreviations or verbiage that are unfamiliar. It may appear blunt or direct. Medical documents are intended to carry relevant information, facts as evident, and clinical opinion of the practitioner.     Disclaimer: Components of this note were documented using voice recognition system and are subject to errors not corrected at proofreading. Contact the author of this note for any clarifications.

## 2024-03-18 NOTE — CM/SW NOTE
Pt newly prescribed Eliquis. Prescription sent to [facility], they are unable to verify coverage or cost, as they are not in network. They can apply 30-day free coupon to allow pt to follow up as an outpatient.    Cardiology team notified. Plan to use 30-day free coupon to allow pt to get refill supplied by an in-network pharmacy.  RN to call [facility] at [phone] when ready for medication to be filled.    1710: Notified by [facility], that pt does not qualify for 30-day free coupon because it was used previously. It is a one time use only. Pt contacted to see if she has been prescribed Eliquis previously. Pt reports she has not been on Eliquis before, (she confirmed w/ her family at bedside) Pt reports that she fills her prescriptions at [facility] in [facility]. Pharmacy called at [phone]. Per [facility], they do not have any record of pt being on Eliquis, but could not confirm if it could have been filled at another pharmacy.    CM/[place] will remain available for DC planning and/or support.     VANDANA EspositoN, CMSRN    [id number]

## 2024-03-18 NOTE — PROGRESS NOTES
[facility]   part of [facility]     Hospitalist Progress Note     Ella Berry Patient Status:  Inpatient    1945 MRN [id number]   Location [facility] 2NE-A Attending Erin Beckford MD   Hosp Day # 5 PCP None Pcp     Chief Complaint: shortness of breath    Subjective:     No new complaints, feels well    Objective:    Review of Systems:   A comprehensive review of systems was completed; pertinent positive and negatives stated in subjective.    Vital signs:  Temp:  [97.3 °F (36.3 °C)-98.3 °F (36.8 °C)] 98.1 °F (36.7 °C)  Pulse:  [] 98  Resp:  [18-24] 20  BP: (138-164)/(72-90) 156/83  SpO2:  [92 %-97 %] 97 %    Physical Exam:    General: No acute distress  Respiratory: No wheezes, no rhonchi  Cardiovascular: S1, S2, regular rate and rhythm  Abdomen: Soft, Non-tender, non-distended, positive bowel sounds  Neuro: No new focal deficits.   Extremities: No edema      Diagnostic Data:    Labs:  Recent Labs   Lab 24  1049 24  0438 03/15/24  0400 24  0506 24  0727 24  0703   WBC 9.3 18.3* 16.4* 14.5* 8.4  --    HGB 13.4 13.1 11.3* 12.4 12.0  --    .0* 98.8 98.0 95.3 99.7  --    .0 221.0 188.0 203.0 160.0 160.0       Recent Labs   Lab 03/15/24  0400 03/15/24  1622 24  0506 24  1516 24  0727 24  0703   *  --  148* 210* 153* 139*   BUN 31*  --  30* 26* 18 18   CREATSERUM 1.02  --  0.91 0.94 0.86 0.79   CA 8.9  --  8.7 8.8 9.3 9.1   ALB 2.8*  --  2.9*  --  3.0*  --    *  --  133* 132* 138 135*   K 3.4*   < > 4.0 4.4 4.1 4.0     --  106 104 108 108   CO2 23.0  --  23.0 23.0 25.0 23.0   ALKPHO 108  --  117  --  111  --    *  --  174*  --  92*  --    *  --  370*  --  284*  --    BILT 0.5  --  0.6  --  0.6  --    TP 5.9*  --  6.4  --  6.5  --     < > = values in this interval not displayed.       Estimated Creatinine Clearance: 45 mL/min (based on SCr of 0.79 mg/dL).    Recent Labs   Lab 24  7264    TROPHS 10       No results for input(s): \"PTP\", \"INR\" in the last 168 hours.         Microbiology    Hospital Encounter on 03/13/24   1. Blood Culture     Status: None (Preliminary result)    Collection Time: 03/13/24 11:06 AM    Specimen: Blood,peripheral   Result Value Ref Range    Blood Culture Result No Growth 4 Days N/A         Imaging: Reviewed in Epic.    Medications:    dilTIAZem ER  120 mg Oral Daily    apixaban  5 mg Oral BID    metoprolol tartrate  100 mg Oral 2x Daily(Beta Blocker)    [Held by provider] metoprolol  5 mg Intravenous Q6H    adenosine  6 mg Intravenous Once    adenosine  12 mg Intravenous Once    fluticasone-umeclidin-vilant  1 puff Inhalation Daily    predniSONE  40 mg Oral Daily with breakfast    ampicillin-sulbactam  3 g Intravenous q6h    ipratropium-albuterol  3 mL Nebulization 6 times per day    gabapentin  300 mg Oral Noon    gabapentin  600 mg Oral Nightly    pramipexole  0.125 mg Oral 5x Daily    levothyroxine  88 mcg Oral Before breakfast    pantoprazole  40 mg Intravenous QAM AC    Or    pantoprazole  40 mg Oral QAM AC       Assessment & Plan:      #New on set A.fib with RVR  -started on oral BB, transition off cardizem gtt as able  -heparin dced, started on eliquis  -Tele monitoring  -ECHO reviewed from 3/14    #Acute COPD exacerbation  #Influenza  -tamiflu  -steroids  -nebs    # YAMILET    # Elevated LFTs  -improving     Dc home today    Erin Beckford MD    Supplementary Documentation:     Quality:  DVT Mechanical Prophylaxis:   SCDs,    DVT Pharmacologic Prophylaxis   Medication    apixaban (Eliquis) tab 5 mg                Code Status: Full Code  Wylie: External urinary catheter in place  Wylie Duration (in days):   Central line:    STORM: 3/18/2024    Discharge is dependent on: progress  At this point Ms. Berry is expected to be discharge to: home    The 21st Century Cures Act makes medical notes like these available to patients in the interest of transparency. Please be  advised this is a medical document. Medical documents are intended to carry relevant information, facts as evident, and the clinical opinion of the practitioner. The medical note is intended as peer to peer communication and may appear blunt or direct. It is written in medical language and may contain abbreviations or verbiage that are unfamiliar.

## 2024-03-18 NOTE — PHYSICAL THERAPY NOTE
PHYSICAL THERAPY EVALUATION - INPATIENT     Room Number: 2601/2601-A  Evaluation Date: 3/18/2024  Type of Evaluation: Initial  Physician Order: PT Eval and Treat    Presenting Problem: COPD exacerbation; +influenza  Co-Morbidities : morbid obesity, CHF, hyponatremia, chronc LBBB and ess HTN  Reason for Therapy: Mobility Dysfunction and Discharge Planning    Chest xray 3/15/24-CONCLUSION:    The heart size and vascularity are normal.  The previously noted vascular congestion shows resolution.   There is decrease in the bilateral central interstitial and ground-glass opacities in the perihilar regions and especially improvement in the lung bases with decrease in the small left effusion.  Findings suggest significant improvement in bilateral pneumonia and venous congestion. No pneumothorax. Surgical clips are noted in both the right and left breasts. Marked degenerative change of the spine noted.    CT angio chest 3/13/24-CONCLUSION:     1. No evidence of pulmonary embolism to the segmental arterial level.  Nondiagnostic assessment of more peripheral pulmonary arterial branches secondary to respiratory motion artifact.    2. Perihilar distribution of interlobular septal thickening and intervening ground-glass and patchy consolidative opacities.  The distribution of disease favors pulmonary edema with interstitial and alveolar components, differential including multifocal   pneumonia.  Trace bilateral pleural effusions.    CT brain (-) 3/13/24    PHYSICAL THERAPY ASSESSMENT   Patient is currently functioning near baseline with transfers, gait, and performing household tasks.  Prior to admission, patient's baseline is indep c ADLs/IADLs and amb.  Patient is requiring supervision as a result of the following impairments: decreased functional strength, decreased endurance/aerobic capacity, and decreased muscular endurance.  Physical Therapy will continue to follow for duration of hospitalization.    Patient will  benefit from continued skilled PT Services For duration of hospitalization, however, given the patient is functioning near baseline level do not anticipate skilled therapy needs at discharge , however will likely benefit from DC c RW as she is from out of town.     PLAN  PT Treatment Plan: Bed mobility;Body mechanics;Endurance;Energy conservation;Patient education;Family education;Gait training;Strengthening;Stair training;Transfer training;Balance training  Rehab Potential : Good  Frequency (Obs): 3x/week  Number of Visits to Meet Established Goals:  (1-2)      CURRENT GOALS    Goal #1 Patient is able to demonstrate supine - sit EOB @ level: modified independent     Goal #2 Patient is able to demonstrate transfers EOB to/from Chair/Wheelchair at assistance level: modified independent     Goal #3 Patient is able to ambulate 300 feet with assist device: walker - rolling at assistance level: modified independent     Goal #4 Pt will demo ability to negotiate 2 steps c supervision by DC   Goal #5    Goal #6    Goal Comments: Goals established on 3/18/2024      PHYSICAL THERAPY MEDICAL/SOCIAL HISTORY  History related to current admission: Patient is a 79 year old female admitted on 3/13/2024 from vacationing home for SOB.  Pt diagnosed with COPD exacerbation; +influenza.      HOME SITUATION  Type of Home: House   Home Layout: One level  Stairs to Enter : 2  Railing: Yes  Stairs to Bedroom:  (basement workspace, 1 flight of stairs)  Railing: Yes    Lives With: Spouse  Drives: Yes  Patient Owned Equipment: Rolling walker;Other (Comment) (RW-in [place])  Patient Regularly Uses: Glasses    Prior Level of Hilliards: Indep c all ADLs/IADLs and amb; lives c supportive spouse who is able to assist c any needs. Has DME at home, however does not use. 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     SUBJECTIVE  \"Oh I am finally doing much better\"      OBJECTIVE  Precautions: None  Fall Risk: Standard fall risk    WEIGHT BEARING  RESTRICTION  Weight Bearing Restriction: None                PAIN ASSESSMENT  Ratin  Location: denies pn       COGNITION  Overall Cognitive Status:  WFL - within functional limits  Arousal/Alertness:  appropriate responses to stimuli  Orientation Level:  oriented x4  Following Commands:  follows all commands and directions without difficulty  Safety Judgement:  good awareness of safety precautions    RANGE OF MOTION AND STRENGTH ASSESSMENT  Upper extremity ROM and strength are within functional limits     Lower extremity ROM is within functional limits     Lower extremity strength is within functional limits       BALANCE  Static Sitting: Good  Dynamic Sitting: Good  Static Standing: Fair -  Dynamic Standing: Poor +    ADDITIONAL TESTS                                    ACTIVITY TOLERANCE                         O2 WALK       NEUROLOGICAL FINDINGS  Neurological Findings: Sensation           Sensation: BLE symmetrical/intact to light touch         AM-PAC '6-Clicks' INPATIENT SHORT FORM - BASIC MOBILITY  How much difficulty does the patient currently have...  Patient Difficulty: Turning over in bed (including adjusting bedclothes, sheets and blankets)?: None   Patient Difficulty: Sitting down on and standing up from a chair with arms (e.g., wheelchair, bedside commode, etc.): A Little   Patient Difficulty: Moving from lying on back to sitting on the side of the bed?: None   How much help from another person does the patient currently need...   Help from Another: Moving to and from a bed to a chair (including a wheelchair)?: A Little   Help from Another: Need to walk in hospital room?: A Little   Help from Another: Climbing 3-5 steps with a railing?: A Little       AM-PAC Score:  Raw Score: 20   Approx Degree of Impairment: 35.83%   Standardized Score (AM-PAC Scale): 47.67   CMS Modifier (G-Code): CJ    FUNCTIONAL ABILITY STATUS  Gait Assessment   Functional Mobility/Gait Assessment  Gait Assistance:  Supervision  Distance (ft): 250  Assistive Device: Rolling walker  Pattern: L Foot flat;R Foot flat;Comment (slow deliberate shani)    Skilled Therapy Provided     Bed Mobility:  Rolling: NT  Supine to sit: NT   Sit to supine: NT     Transfer Mobility:  Sit to stand: supervision   Stand to sit: supervision  Gait = supervision x250' c RW    Therapist's Comments: Pt presents to PT sitting up in BSC. Pt had a breathing tx prior to mobility. Pt is able to sit/stand c supervision and RW. Pt denies any LH/dizziness. Pt able to amb 250' c RW and supervision c above gait deviations. D/t droplet isolation-pt had a mask donned for hallway activity and reported mild SOB following amb. However despite this, sats remained in the mid 90's on RA. Following, she t/f back to sitting up in her BSC c spouse present. RN aware of session.     Exercise/Education Provided:  Bed mobility  Body mechanics  Energy conservation  Functional activity tolerated  Gait training  Posture  Transfer training    Patient End of Session: Up in chair;Call light within reach;Needs met;RN aware of session/findings;All patient questions and concerns addressed;Family present      Patient Evaluation Complexity Level:  History Moderate - 1 or 2 personal factors and/or co-morbidities   Examination of body systems Low - addressing 1-2 elements   Clinical Presentation Low - Stable   Clinical Decision Making Low - Stable       PT Session Time: 30 minutes  Gait Training: 10 minutes  Therapeutic Activity: 5 minutes

## 2024-03-18 NOTE — PROGRESS NOTES
S: Pt is breathing ok. She was able to walk in the halls. She is not coughing too much.     Meds:   apixaban  5 mg Oral BID    metoprolol tartrate  100 mg Oral 2x Daily(Beta Blocker)    [Held by provider] metoprolol  5 mg Intravenous Q6H    adenosine  6 mg Intravenous Once    adenosine  12 mg Intravenous Once    fluticasone-umeclidin-vilant  1 puff Inhalation Daily    predniSONE  40 mg Oral Daily with breakfast    ampicillin-sulbactam  3 g Intravenous q6h    ipratropium-albuterol  3 mL Nebulization 6 times per day    gabapentin  300 mg Oral Noon    gabapentin  600 mg Oral Nightly    pramipexole  0.125 mg Oral 5x Daily    levothyroxine  88 mcg Oral Before breakfast    pantoprazole  40 mg Intravenous QAM AC    Or    pantoprazole  40 mg Oral QAM AC       Prn Meds:  melatonin, metoclopramide, labetalol, sodium chloride, ondansetron    Infusions:   dilTIAZem 2.5 mg/hr (03/18/24 0730)       OBJECTIVE:  Vitals:    03/17/24 1925 03/17/24 2300 03/18/24 0327 03/18/24 0849   BP: 146/88 (!) 164/78 156/84 138/90   Pulse: 73 115 111 91   Resp: 24 22 22 18   Temp: 97.6 °F (36.4 °C) 97.6 °F (36.4 °C) 97.3 °F (36.3 °C) 98.3 °F (36.8 °C)   TempSrc: Oral Oral Oral Oral   SpO2: 95% 95% 93% 92%   Weight:       Height:         O2: room air    Gen - Alert, oriented x 3, in no apparent distress  Lungs - + slight expiratory wheezing.  CV - regular rate & rhythm. Normal S1, S2. No murmurs, rubs, or gallops appreciated.  Abdomen - soft, nontender to palpation   Extremities - No cyanosis, clubbing, edema appreciated.        Labs:  Recent Labs   Lab 03/15/24  0400 03/16/24  0506 03/17/24  0727 03/18/24  0703   WBC 16.4* 14.5* 8.4  --    HGB 11.3* 12.4 12.0  --    .0 203.0 160.0 160.0     Recent Labs   Lab 03/13/24  1049 03/14/24  0438 03/15/24  0400 03/15/24  1622 03/16/24  0506 03/16/24  1516 03/17/24  0727 03/18/24  0703   *   < > 132*  --  133* 132* 138 135*   K 3.9   < > 3.4*   < > 4.0 4.4 4.1 4.0      < >  100  --  106 104 108 108   CO2 24.0   < > 23.0  --  23.0 23.0 25.0 23.0   BUN 13   < > 31*  --  30* 26* 18 18   CREATSERUM 0.88   < > 1.02  --  0.91 0.94 0.86 0.79   *   < > 166*  --  148* 210* 153* 139*   ANIONGAP 6   < > 9  --  4 5 5 4   ALB 3.7  --  2.8*  --  2.9*  --  3.0*  --    CA 9.7   < > 8.9  --  8.7 8.8 9.3 9.1   MG  --   --  2.5  --   --  2.1  --   --    ALKPHO 111  --  108  --  117  --  111  --    AST 16  --  404*  --  174*  --  92*  --    ALT 29  --  471*  --  370*  --  284*  --    BILT 0.6  --  0.5  --  0.6  --  0.6  --    TP 7.1  --  5.9*  --  6.4  --  6.5  --    LACTI 1.8  --   --   --   --   --   --   --     < > = values in this interval not displayed.     Recent Labs   Lab 03/16/24  0506 03/16/24  1200 03/17/24  0727   PTT 27.1 64.7* 54.7*     Recent Labs   Lab 03/13/24  1049   PBNP 727*   TROPHS 10     Recent Labs   Lab 03/13/24  1049 03/15/24  0400   DDIMER 0.86*  --    PCT  --  19.95*     Recent Labs   Lab 03/13/24  1502 03/13/24  1821 03/14/24  1559   ABGPHT 7.31* 7.40 7.43   YZSLTV2T 43 35 35   ZUPTE7Y 140* 94 79*   ABGHCO3 21.4 23.0 24.4   ABGBE -4.5* -2.5* -0.7       Recent Labs   Lab 03/13/24  1049   COVID19 Not Detected   INFAPCR Positive*   INFBPCR Negative   RSV Negative       Imaging reviewed    ASSESSMENT AND PLAN      Acute hypoxemic respiratory failure  - due to AECOPD/asthma , influenza with associated superimposed bacterial PNA, A.Fib with RVR, and pulmonary edema. CTA 3/13 was negative for PE, but showed GGO with patchy consolidation and interlobular septal thickening   -supplemental oxygen prn - now on room air  -diuresis and rate control per cards  -continue prednisone taper  -continue antibiotics as below  AECOPD/asthma exacerbation - triggered by influenza  -prednisone taper  -trelegy 100 in lieu of home symbicort/spiriva  -BD protocol  -outpatient PFT's recommended.  -follow up with pulmonary MD in [place]  PNA: concern for aspiration given emesis while in CT. PCT  significantly elevated. Sputum culture with normal respiratory richar  -unasyn (3/15 - )--> change to augmentin at hospital discharge   Influenza A:  -tamiflu completed  A.Fib with RVR/pulm edema  -rate control per cards  -diuretics per cardiology  -anticoagulated w/ eliquis  Dispo:  -full code  -no objection to discharge from a pulmonary standpoint. Pt should follow up with her pulmonologist in [place] in 1-2 weeks.     Kyle Luna M.D.  Pulmonary/Critical Care

## 2024-03-18 NOTE — DIETARY NOTE
[facility]   part of [facility]  NUTRITION ASSESSMENT    Pt does not meet malnutrition criteria at this time.    NUTRITION INTERVENTION:    Meal and Snacks - Cardiac Diet.  Nutrition Education- Provided handout on General Healthy Nutrition Therapy with list of foods recommended, foods to avoid/limit, sample menu/snack ideas, and planning meals using the My Plate Method (1/2 plate nonstarchy veg, 1/4 plate lean protein, & 1/4 plate CHO). All pt's nutrition related questions answered at this time. Provided contact information for nutrition services.       PATIENT STATUS:     3/18- Pt intubated on 3/13 and extubated on 3/14. Noted RRT for SVT on 3/16. Diet advanced to Cardiac on 3/14. Pt reported good appetite. Recorded PO intakes:100% most meals. Provided pt with information on General Healthy Nutrition per pt's request.      3/14- 79 year old female admitted on 3/13 presents with COPD exacerbation triggered by influenza A. Pt is currently intubated, sedated, weaned off pressors this AM, NPO with OGT to LIS. Pt screened d/t consult for TF; discussed starting if unable to extubate. Will provide TF recs above.    PMH:  has a past medical history of COPD (chronic obstructive pulmonary disease) (HCC), Essential hypertension, Left bundle branch block, Metabolic encephalopathy, and Pneumonia.    ANTHROPOMETRICS:  Ht: 154.9 cm (5' 1\")  Wt: 78.3 kg (172 lb 9.9 oz).   BMI: Body mass index is 32.62 kg/m².  IBW: 47.7 kg    WEIGHT HISTORY:   *Pt stated usual wt range of 165 lb-169 lb. Reported wt to be stable and denied any unintentional wt changes.    Patient Weight(s) for the past 336 hrs:   Weight   03/13/24 2000 78.3 kg (172 lb 9.9 oz)   03/13/24 1030 72.6 kg (160 lb)       Wt Readings from Last 10 Encounters:   03/13/24 78.3 kg (172 lb 9.9 oz)        NUTRITION:  Diet:       Procedures    Cardiac diet Cardiac; Is Patient on Accuchecks? No        Percent Meals Eaten (last 3 days)       Date/Time Percent Meals Eaten  (%)    03/15/24 0838 100 %    03/16/24 0907 100 %    03/16/24 1900 100 %    03/17/24 1925 100 %    03/18/24 0849 100 %            Food Allergies: No  Cultural/Ethnic/Adventist Preferences Addressed: Yes    GI SYSTEM REVIEW:  intermittent nausea, Last BM:3/16.  Skin/Wounds: WNL    NUTRITION RELATED PHYSICAL FINDINGS:     1. Body Fat/Muscle Mass: no wasting noted     2. Fluid Accumulation: none per RN documentation     NUTRITION PRESCRIPTION: 47.7 kg IBW  Calories: 5483-1690 calories/day (25-30 kcal/kg)  Protein:  grams protein/day (2-2.5 gm/kg IBW)  Fluid: ~1 ml/kcal or per MD discretion    NUTRITION DIAGNOSIS/PROBLEM:  Inadequate oral intake related to respiratory process or complication of therapy which results in mechanical ventilation as evidenced by need for NPO status (Resolved w/ extubation and diet advancement).    MONITOR AND EVALUATE/NUTRITION GOALS:  PO intake of 75% of meals TID - Met, continues  Weight stable within 1 to 2 lbs during admission - Ongoing  Start alternative nutrition in 24-48 hrs if diet is not able to advance- Resolved      MEDICATIONS:  IV abx, Prednisone    LABS:  Glu:139, Na++:135    Pt is at Low nutrition risk    Jackie Torres MS, RD, LDN  Clinical Dietitian  Ext:[phone]

## 2024-03-18 NOTE — PLAN OF CARE
0045      Pt. HR 90's to 120's , increased Cardizem gtt to 10 mg/hr.                 Alert and ox 4 , on RA , not in any form of distress.  POC: IV Unasyn , Cardizem gtt           PO Metoprolol / Eliquis           Nebs / Tamiflu / Prednisone  Problem: CARDIOVASCULAR - ADULT  Goal: Maintains optimal cardiac output and hemodynamic stability  Description: INTERVENTIONS:  - Monitor vital signs, rhythm, and trends  - Monitor for bleeding, hypotension and signs of decreased cardiac output  - Evaluate effectiveness of vasoactive medications to optimize hemodynamic stability  - Monitor arterial and/or venous puncture sites for bleeding and/or hematoma  - Assess quality of pulses, skin color and temperature  - Assess for signs of decreased coronary artery perfusion - ex. Angina  - Evaluate fluid balance, assess for edema, trend weights  Outcome: Progressing  Goal: Absence of cardiac arrhythmias or at baseline  Description: INTERVENTIONS:  - Continuous cardiac monitoring, monitor vital signs, obtain 12 lead EKG if indicated  - Evaluate effectiveness of antiarrhythmic and heart rate control medications as ordered  - Initiate emergency measures for life threatening arrhythmias  - Monitor electrolytes and administer replacement therapy as ordered  Outcome: Progressing     Problem: RESPIRATORY - ADULT  Goal: Achieves optimal ventilation and oxygenation  Description: INTERVENTIONS:  - Assess for changes in respiratory status  - Assess for changes in mentation and behavior  - Position to facilitate oxygenation and minimize respiratory effort  - Oxygen supplementation based on oxygen saturation or ABGs  - Provide Smoking Cessation handout, if applicable  - Encourage broncho-pulmonary hygiene including cough, deep breathe, Incentive Spirometry  - Assess the need for suctioning and perform as needed  - Assess and instruct to report SOB or any respiratory difficulty  - Respiratory Therapy support as indicated  - Manage/alleviate  anxiety  - Monitor for signs/symptoms of CO2 retention  Outcome: Progressing     Problem: Patient/Family Goals  Goal: Patient/Family Long Term Goal  Description: Patient's Long Term Goal: DC to home     Interventions:  - follow plan of care        - See additional Care Plan goals for specific interventions  Outcome: Progressing  Goal: Patient/Family Short Term Goal  Description: Patient's Short Term Goal: get better soon     Interventions:   - meds    - See additional Care Plan goals for specific interventions  Outcome: Progressing

## 2024-03-18 NOTE — PLAN OF CARE
Assumed care of pt around 0730  AxO x4, up x1 assist walker  R/A, expiratory wheezes  A fib on tele, HR control w/ oral metoprolol and cardizem  -Hrs elevated today, cardizem dose increased, x1 IV lopressor, per [facility] APN not to restart cardizem gtt unless HR sustaining 140, evening metoprolol given early per APN request  Plan: Nebs scheduled, PO steroid, IV abx  Continent of bowel and bladder  Fall precautions in place  Pt updated on plan of care, all needs met at this time          Problem: Patient/Family Goals  Goal: Patient/Family Long Term Goal  Description: Patient's Long Term Goal: DC to home     Interventions:  - follow plan of care        - See additional Care Plan goals for specific interventions  Outcome: Progressing  Goal: Patient/Family Short Term Goal  Description: Patient's Short Term Goal: get better soon     Interventions:   - meds    - See additional Care Plan goals for specific interventions  Outcome: Progressing     Problem: CARDIOVASCULAR - ADULT  Goal: Maintains optimal cardiac output and hemodynamic stability  Description: INTERVENTIONS:  - Monitor vital signs, rhythm, and trends  - Monitor for bleeding, hypotension and signs of decreased cardiac output  - Evaluate effectiveness of vasoactive medications to optimize hemodynamic stability  - Monitor arterial and/or venous puncture sites for bleeding and/or hematoma  - Assess quality of pulses, skin color and temperature  - Assess for signs of decreased coronary artery perfusion - ex. Angina  - Evaluate fluid balance, assess for edema, trend weights  Outcome: Progressing  Goal: Absence of cardiac arrhythmias or at baseline  Description: INTERVENTIONS:  - Continuous cardiac monitoring, monitor vital signs, obtain 12 lead EKG if indicated  - Evaluate effectiveness of antiarrhythmic and heart rate control medications as ordered  - Initiate emergency measures for life threatening arrhythmias  - Monitor electrolytes and administer replacement therapy  as ordered  Outcome: Progressing     Problem: RESPIRATORY - ADULT  Goal: Achieves optimal ventilation and oxygenation  Description: INTERVENTIONS:  - Assess for changes in respiratory status  - Assess for changes in mentation and behavior  - Position to facilitate oxygenation and minimize respiratory effort  - Oxygen supplementation based on oxygen saturation or ABGs  - Provide Smoking Cessation handout, if applicable  - Encourage broncho-pulmonary hygiene including cough, deep breathe, Incentive Spirometry  - Assess the need for suctioning and perform as needed  - Assess and instruct to report SOB or any respiratory difficulty  - Respiratory Therapy support as indicated  - Manage/alleviate anxiety  - Monitor for signs/symptoms of CO2 retention  Outcome: Progressing     Problem: GASTROINTESTINAL - ADULT  Goal: Minimal or absence of nausea and vomiting  Description: INTERVENTIONS:  - Maintain adequate hydration with IV or PO as ordered and tolerated  - Nasogastric tube to low intermittent suction as ordered  - Evaluate effectiveness of ordered antiemetic medications  - Provide nonpharmacologic comfort measures as appropriate  - Advance diet as tolerated, if ordered  - Obtain nutritional consult as needed  - Evaluate fluid balance  Outcome: Progressing  Goal: Maintains or returns to baseline bowel function  Description: INTERVENTIONS:  - Assess bowel function  - Maintain adequate hydration with IV or PO as ordered and tolerated  - Evaluate effectiveness of GI medications  - Encourage mobilization and activity  - Obtain nutritional consult as needed  - Establish a toileting routine/schedule  - Consider collaborating with pharmacy to review patient's medication profile  Outcome: Progressing     Problem: GENITOURINARY - ADULT  Goal: Absence of urinary retention  Description: INTERVENTIONS:  - Assess patient’s ability to void and empty bladder  - Monitor intake/output and perform bladder scan as needed  - Follow urinary  retention protocol/standard of care  - Consider collaborating with pharmacy to review patient's medication profile  - Implement strategies to promote bladder emptying  Outcome: Progressing     Problem: METABOLIC/FLUID AND ELECTROLYTES - ADULT  Goal: Glucose maintained within prescribed range  Description: INTERVENTIONS:  - Monitor Blood Glucose as ordered  - Assess for signs and symptoms of hyperglycemia and hypoglycemia  - Administer ordered medications to maintain glucose within target range  - Assess barriers to adequate nutritional intake and initiate nutrition consult as needed  - Instruct patient on self management of diabetes  Outcome: Progressing  Goal: Electrolytes maintained within normal limits  Description: INTERVENTIONS:  - Monitor labs and rhythm and assess patient for signs and symptoms of electrolyte imbalances  - Administer electrolyte replacement as ordered  - Monitor response to electrolyte replacements, including rhythm and repeat lab results as appropriate  - Fluid restriction as ordered  - Instruct patient on fluid and nutrition restrictions as appropriate  Outcome: Progressing  Goal: Hemodynamic stability and optimal renal function maintained  Description: INTERVENTIONS:  - Monitor labs and assess for signs and symptoms of volume excess or deficit  - Monitor intake, output and patient weight  - Monitor urine specific gravity, serum osmolarity and serum sodium as indicated or ordered  - Monitor response to interventions for patient's volume status, including labs, urine output, blood pressure (other measures as available)  - Encourage oral intake as appropriate  - Instruct patient on fluid and nutrition restrictions as appropriate  Outcome: Progressing     Problem: HEMATOLOGIC - ADULT  Goal: Free from bleeding injury  Description: (Example usage: patient with low platelets)  INTERVENTIONS:  - Avoid intramuscular injections, enemas and rectal medication administration  - Ensure safe mobilization  of patient  - Hold pressure on venipuncture sites to achieve adequate hemostasis  - Assess for signs and symptoms of internal bleeding  - Monitor lab trends  - Patient is to report abnormal signs of bleeding to staff  - Avoid use of toothpicks and dental floss  - Use electric shaver for shaving  - Use soft bristle tooth brush  - Limit straining and forceful nose blowing  Outcome: Progressing     Problem: PAIN - ADULT  Goal: Verbalizes/displays adequate comfort level or patient's stated pain goal  Description: INTERVENTIONS:  - Encourage pt to monitor pain and request assistance  - Assess pain using appropriate pain scale  - Administer analgesics based on type and severity of pain and evaluate response  - Implement non-pharmacological measures as appropriate and evaluate response  - Consider cultural and social influences on pain and pain management  - Manage/alleviate anxiety  - Utilize distraction and/or relaxation techniques  - Monitor for opioid side effects  - Notify MD/LIP if interventions unsuccessful or patient reports new pain  - Anticipate increased pain with activity and pre-medicate as appropriate  Outcome: Progressing     Problem: SAFETY ADULT - FALL  Goal: Free from fall injury  Description: INTERVENTIONS:  - Assess pt frequently for physical needs  - Identify cognitive and physical deficits and behaviors that affect risk of falls.  - [place] fall precautions as indicated by assessment.  - Educate pt/family on patient safety including physical limitations  - Instruct pt to call for assistance with activity based on assessment  - Modify environment to reduce risk of injury  - Provide assistive devices as appropriate  - Consider OT/PT consult to assist with strengthening/mobility  - Encourage toileting schedule  Outcome: Progressing

## 2024-03-19 PROBLEM — I48.91 ATRIAL FIBRILLATION (HCC): Status: ACTIVE | Noted: 2024-03-16

## 2024-03-19 LAB
ANION GAP SERPL CALC-SCNC: 4 MMOL/L (ref 0–18)
BUN BLD-MCNC: 22 MG/DL (ref 9–23)
CALCIUM BLD-MCNC: 9.6 MG/DL (ref 8.5–10.1)
CHLORIDE SERPL-SCNC: 107 MMOL/L (ref 98–112)
CO2 SERPL-SCNC: 23 MMOL/L (ref 21–32)
CREAT BLD-MCNC: 0.74 MG/DL
EGFRCR SERPLBLD CKD-EPI 2021: 83 ML/MIN/1.73M2 (ref 60–?)
ERYTHROCYTE [DISTWIDTH] IN BLOOD BY AUTOMATED COUNT: 15.8 %
GLUCOSE BLD-MCNC: 147 MG/DL (ref 70–99)
HCT VFR BLD AUTO: 35.3 %
HGB BLD-MCNC: 11.8 G/DL
MCH RBC QN AUTO: 33 PG (ref 26–34)
MCHC RBC AUTO-ENTMCNC: 33.4 G/DL (ref 31–37)
MCV RBC AUTO: 98.6 FL
OSMOLALITY SERPL CALC.SUM OF ELEC: 284 MOSM/KG (ref 275–295)
PLATELET # BLD AUTO: 148 10(3)UL (ref 150–450)
PLATELET # BLD AUTO: 148 10(3)UL (ref 150–450)
POTASSIUM SERPL-SCNC: 4.2 MMOL/L (ref 3.5–5.1)
RBC # BLD AUTO: 3.58 X10(6)UL
SODIUM SERPL-SCNC: 134 MMOL/L (ref 136–145)
WBC # BLD AUTO: 7.2 X10(3) UL (ref 4–11)

## 2024-03-19 PROCEDURE — 99232 SBSQ HOSP IP/OBS MODERATE 35: CPT | Performed by: HOSPITALIST

## 2024-03-19 RX ORDER — DILTIAZEM HYDROCHLORIDE 240 MG/1
240 CAPSULE, COATED, EXTENDED RELEASE ORAL DAILY
Status: DISCONTINUED | OUTPATIENT
Start: 2024-03-20 | End: 2024-03-20

## 2024-03-19 RX ORDER — ACETAMINOPHEN 500 MG
500 TABLET ORAL EVERY 6 HOURS PRN
Status: DISCONTINUED | OUTPATIENT
Start: 2024-03-19 | End: 2024-03-20

## 2024-03-19 RX ORDER — DILTIAZEM HYDROCHLORIDE 240 MG/1
240 CAPSULE, COATED, EXTENDED RELEASE ORAL DAILY
Qty: 30 CAPSULE | Refills: 0 | Status: SHIPPED | OUTPATIENT
Start: 2024-03-20 | End: 2024-03-20

## 2024-03-19 RX ORDER — DILTIAZEM HYDROCHLORIDE 60 MG/1
60 TABLET, FILM COATED ORAL EVERY 6 HOURS SCHEDULED
Status: COMPLETED | OUTPATIENT
Start: 2024-03-19 | End: 2024-03-19

## 2024-03-19 NOTE — PROGRESS NOTES
Progress Note  Ella Berry Patient Status:  Inpatient    1945 MRN [id number]   Location [facility] 2NE-A Attending Erin Beckford MD   Hosp Day # 6 PCP None Pcp     Subjective:  Mr. Berry reports improved symptoms today. She denies chest pain/pressure or palpitations.  Still some dyspnea through the night, but mild.  Ventricular rates from  bpm    Objective:  Physical Exam:   /90 (BP Location: Left arm)   Pulse 115   Temp 97.8 °F (36.6 °C) (Oral)   Resp 20   Ht 5' 1\" (1.549 m)   Wt 172 lb 9.9 oz (78.3 kg)   SpO2 94%   BMI 32.62 kg/m²   Temp (24hrs), Av.8 °F (36.6 °C), Min:97.5 °F (36.4 °C), Max:98.3 °F (36.8 °C)       Intake/Output Summary (Last 24 hours) at 3/19/2024 0827  Last data filed at 3/19/2024 0352  Gross per 24 hour   Intake 640 ml   Output 600 ml   Net 40 ml     Wt Readings from Last 3 Encounters:   24 172 lb 9.9 oz (78.3 kg)     Telemetry: Afib with ventricular rates in the 90 -120 bpm  General: Alert and oriented in no apparent distress seated comfortably in the bedside chair.  HEENT: No focal deficits.  Neck: No JVD, carotids 2+ no bruits.  Cardiac: Irregularly irregular, S1, S2 normal, no murmur, rub or gallop.  Lungs: expiratory wheezing intermittently with prolonged expiratory phase and crackles in the bases bilaterally. Breathing comfortably on room air.  Abdomen: Soft, non-tender.   Extremities: Without clubbing, cyanosis or edema.  Peripheral pulses are 2+.  Neurologic: Alert and oriented, normal affect.  Skin: Warm and dry.        Intake/Output:    Intake/Output Summary (Last 24 hours) at 3/19/2024 0827  Last data filed at 3/19/2024 0352  Gross per 24 hour   Intake 640 ml   Output 600 ml   Net 40 ml       Last 3 Weights   24 2000 172 lb 9.9 oz (78.3 kg)   24 1030 160 lb (72.6 kg)       Labs:  Recent Labs   Lab 24  0727 24  0703 24  0458   * 139* 147*   BUN 18 18 22   CREATSERUM 0.86 0.79 0.74   EGFRCR 70 77  83   CA 9.3 9.1 9.6    135* 134*   K 4.1 4.0 4.2    108 107   CO2 25.0 23.0 23.0     Recent Labs   Lab 03/15/24  0400 03/16/24  0506 03/17/24  0727 03/18/24  0703 03/19/24  0458   RBC 3.54* 3.86 3.68*  --  3.58*   HGB 11.3* 12.4 12.0  --  11.8*   HCT 34.7* 36.8 36.7  --  35.3   MCV 98.0 95.3 99.7  --  98.6   MCH 31.9 32.1 32.6  --  33.0   MCHC 32.6 33.7 32.7  --  33.4   RDW 16.1 16.1 16.2  --  15.8   NEPRELIM 15.61* 12.83* 6.34  --   --    WBC 16.4* 14.5* 8.4  --  7.2   .0 203.0 160.0 160.0 148.0*  148.0*         Recent Labs   Lab 03/13/24  1049   TROPHS 10       Diagnostics:     ECHOCARDIOGRAM 3/14/2024:  1. Left ventricle: The cavity size was normal. Wall thickness was mildly      increased. Systolic function was vigorous. The estimated ejection      fraction was 65-70%, by visual assessment. No diagnostic evidence for      regional wall motion abnormalities. Unable to assess LV diastolic      function.   2. Left atrium: The atrium was mildly dilated.   3. Aortic valve: There was mild regurgitation.   4. Mitral valve: The annulus was mildly to moderately calcified.   5. Pulmonary arteries: Systolic pressure was mildly increased, in the range of 30mm Hg to 35mm Hg.   Impressions:  No previous study from [facility] was   available for comparison.       Medications:   ipratropium-albuterol  3 mL Nebulization 4 times per day    dilTIAZem ER  180 mg Oral Daily    apixaban  5 mg Oral BID    metoprolol tartrate  100 mg Oral 2x Daily(Beta Blocker)    adenosine  6 mg Intravenous Once    adenosine  12 mg Intravenous Once    fluticasone-umeclidin-vilant  1 puff Inhalation Daily    predniSONE  40 mg Oral Daily with breakfast    ampicillin-sulbactam  3 g Intravenous q6h    gabapentin  300 mg Oral Noon    gabapentin  600 mg Oral Nightly    pramipexole  0.125 mg Oral 5x Daily    levothyroxine  88 mcg Oral Before breakfast    pantoprazole  40 mg Intravenous QAM AC    Or    pantoprazole  40 mg  Oral QAM AC         Assessment/Plan:  Mrs. Berry is a 80 yo female with a PMH of COPD, HTN, and chronic LBBB. She presented to the ED on 3/13 with dyspnea. Her D-dimer was elevated and she was positive for influenza A. CTA negative for PE. Also noted to have pulmonary edema. We were consulted for atrial fibrillation with RVR.      Acute hypoxic respiratory failure/influenza/PNA  Steroids - completed tamiflu  Pulmonary team following  New onset afib RVR currently rate 's and on Eliquis  Currently on Diltiazem  mg daily and metoprolol tartrate 100mg BID  Continue Eliquis 5 mg BID  HTN  Above goal -up titrate diltiazem as tolerated by HR  Chronic LBBB  COPD/Asthma  Pulm following    PLAN  Increase diltiazem to 240 mg daily  Continue metoprolol 100 mg BID  If rates are not controlled consider addition of digoxin  Continue Eliquis 5 mg BID - working with case management team for outpatient pricing  Pulmonary following  Consider cardioversion if unable to maintain rate control tomorrow      ADDENDUM 3/19: 9:29: Discussed with case management team and Eliquis 30 day free trial is not working appropriately. Will price check Xarelto 20 mg daily. Crcl calculated 78 mL/min    Ruben Santoro PA-C  3/19/2024  8:27 AM    Patient seen and examined independently.  Note reviewed and labs reviewed. Agree with above assessment and plan.  79-year-old female who presented with acute hypoxic respiratory failure initially requiring ventilation 2/2 PNA, influenza A.  Noted to have atrial fibrillation with rapid ventricular response.  Has been started on DOAC.  Planning for continued rate control strategy.  Increase Cardizem p.o. dosing today.  If heart rate is not well-controlled this afternoon, will consider addition of digoxin.  Otherwise, may require SALEEM/DCCV tomorrow.  May need anesthesia assistance given concurrent respiratory issues.  Will continue to follow.        Balta Patel DO  Cardiologist  [facility]  [place]  3/19/2024 1:53 PM      Note to the patient: The 21st Century Cures Act makes medical notes like these available to patients in the interest of transparency. However, be advised that this is a medical document. It is intended as peer to peer communication. It is written in medical language and may contain abbreviations or verbiage that are unfamiliar. It may appear blunt or direct. Medical documents are intended to carry relevant information, facts as evident, and clinical opinion of the practitioner.     Disclaimer: Components of this note were documented using voice recognition system and are subject to errors not corrected at proofreading. Contact the author of this note for any clarifications.

## 2024-03-19 NOTE — PROGRESS NOTES
S: Pt is feeling ok, she's not having too much dyspnea. She is having some issues with AF/RVR so there is a plan for DCCV tomorrow.    Meds:   [START ON 3/20/2024] dilTIAZem ER  240 mg Oral Daily    dilTIAZem  60 mg Oral 4 times per day    ipratropium-albuterol  3 mL Nebulization 4 times per day    apixaban  5 mg Oral BID    metoprolol tartrate  100 mg Oral 2x Daily(Beta Blocker)    adenosine  6 mg Intravenous Once    adenosine  12 mg Intravenous Once    fluticasone-umeclidin-vilant  1 puff Inhalation Daily    predniSONE  40 mg Oral Daily with breakfast    ampicillin-sulbactam  3 g Intravenous q6h    gabapentin  300 mg Oral Noon    gabapentin  600 mg Oral Nightly    pramipexole  0.125 mg Oral 5x Daily    levothyroxine  88 mcg Oral Before breakfast    pantoprazole  40 mg Intravenous QAM AC    Or    pantoprazole  40 mg Oral QAM AC       Prn Meds:  acetaminophen, melatonin, metoclopramide, labetalol, sodium chloride, ondansetron    Infusions:        OBJECTIVE:  Vitals:    03/19/24 0810 03/19/24 1110 03/19/24 1216 03/19/24 1621   BP: 149/90 141/90 (!) 140/92 142/86   Pulse: 115 (!) 131 109 105   Resp: 20  22 20   Temp: 97.8 °F (36.6 °C)  98 °F (36.7 °C) 97.6 °F (36.4 °C)   TempSrc: Oral  Oral Oral   SpO2: 94% 96% 94% 96%   Weight:       Height:         O2: room air    Gen - Alert, oriented x 3, in no apparent distress  Lungs - + slight expiratory wheezing.  CV - tachy, irreg  Abdomen - soft, nontender to palpation   Extremities - No cyanosis, clubbing, edema appreciated.        Labs:  Recent Labs   Lab 03/16/24  0506 03/17/24  0727 03/18/24  0703 03/19/24  0458   WBC 14.5* 8.4  --  7.2   HGB 12.4 12.0  --  11.8*   .0 160.0 160.0 148.0*  148.0*     Recent Labs   Lab 03/13/24  1049 03/14/24  0438 03/15/24  0400 03/15/24  1622 03/16/24  0506 03/16/24  1516 03/17/24  0727 03/18/24  0703 03/19/24  0458   *   < > 132*  --  133* 132* 138 135* 134*   K 3.9   < > 3.4*   < > 4.0 4.4 4.1 4.0 4.2   CL  102   < > 100  --  106 104 108 108 107   CO2 24.0   < > 23.0  --  23.0 23.0 25.0 23.0 23.0   BUN 13   < > 31*  --  30* 26* 18 18 22   CREATSERUM 0.88   < > 1.02  --  0.91 0.94 0.86 0.79 0.74   *   < > 166*  --  148* 210* 153* 139* 147*   ANIONGAP 6   < > 9  --  4 5 5 4 4   ALB 3.7  --  2.8*  --  2.9*  --  3.0*  --   --    CA 9.7   < > 8.9  --  8.7 8.8 9.3 9.1 9.6   MG  --   --  2.5  --   --  2.1  --   --   --    ALKPHO 111  --  108  --  117  --  111  --   --    AST 16  --  404*  --  174*  --  92*  --   --    ALT 29  --  471*  --  370*  --  284*  --   --    BILT 0.6  --  0.5  --  0.6  --  0.6  --   --    TP 7.1  --  5.9*  --  6.4  --  6.5  --   --    LACTI 1.8  --   --   --   --   --   --   --   --     < > = values in this interval not displayed.     Recent Labs   Lab 03/16/24  0506 03/16/24  1200 03/17/24  0727   PTT 27.1 64.7* 54.7*     Recent Labs   Lab 03/13/24  1049   PBNP 727*   TROPHS 10     Recent [facility]   Lab 03/13/24  1049 03/15/24  0400   DDIMER 0.86*  --    PCT  --  19.95*     Recent [facility]   Lab 03/13/24  1502 03/13/24  1821 03/14/24  1559   ABGPHT 7.31* 7.40 7.43   NSFTDW4P 43 35 35   FXHFS0E 140* 94 79*   ABGHCO3 21.4 23.0 24.4   ABGBE -4.5* -2.5* -0.7       Recent Labs   Lab 03/13/24  1049   COVID19 Not Detected   INFAPCR Positive*   INFBPCR Negative   RSV Negative       Imaging reviewed    ASSESSMENT AND PLAN      Acute hypoxemic respiratory failure  - due to AECOPD/asthma,  influenza with superimposed bacterial PNA, A.Fib with RVR, and pulmonary edema. CTA 3/13 was negative for PE but showed GGO with patchy consolidation and interlobular septal thickening   -supplemental oxygen prn - now on room air  -diuretics per cardiology  -rate control of AF per cards  -continue prednisone taper  -continue antibiotics as below  AECOPD/asthma exacerbation - triggered by influenza  -prednisone taper  -trelegy 100 in lieu of home symbicort/spiriva  -BD protocol  -outpatient PFT's recommended.  -follow up with  pulmonologist in [place]  PNA: concern for aspiration given emesis while in CT. PCT significantly elevated. Sputum culture with normal respiratory richar  -continue unasyn (3/15 - )--> change to augmentin at hospital discharge   Influenza A:  -tamiflu completed  A.Fib with RVR/pulm edema  -rate control per cards; plan is for DCCV tomorrow  -diuretics per cardiology  -anticoagulated w/ eliquis  Dispo:  -full code  -hopefully home tomorrow if ok with cardiology    We will continue to follow with you       Kyle Luna M.D.  Pulmonary/Critical Care

## 2024-03-19 NOTE — CM/SW NOTE
Cardiology contacted to see if we could prescribe pt Xarelto rather than Eliquis due to cost of medication. No record of pt previously receiving Xarelto. Prescription sent to [facility], they can apply the 30-day free coupon and provide supply prior to pt's DC.    Pt also asking what she would need to do to allow her physicians to get any necessary medical records after her DC. D/w pt and sps, will assist w/ completing release for medical records and place I pt's chart and provide contact information for medical records for follow up post discharge.     Addendum:  Release for medical records completed w/ pt and added to pt's chart. Contact information for [facility] medical records added to pt's DC AVS.    CM/SW will remain available for DC planning and/or support.     VANDANA EspositoN, CMSRN    [phone]

## 2024-03-19 NOTE — CDS QUERY
Clinical Documentation Clarification    Dear Dr. Issa,    Please clarify the diagnosis for which Lasix was given.  (   ) Acute on chronic diastolic heart failure  (  x ) Acute pulmonary edema  (   ) Other - please specify:  ___________________________________________________________________________    Clinical Indicators:   3/13 CTA Chest: Perihilar distribution of interlobular septal thickening and intervening ground-glass and patchy consolidative opacities. The distribution of disease favors pulmonary edema with interstitial and alveolar components.    3/13 ED Provider Impression: COPD Exac, Hypoxia, Influenza, CHF   3/13 H&P: Acute respiratory failure due to influenza with underlying COPD,  Fluid overload     3/13 Intensivist: Acute hypoxic resp failure- secondary to AECOPD triggered by influenza and also component of pulmonary edema…CHF- CXR c/w pulm edema   3/16 Cardiology Consult: AF with RVR, echo with normal LVEF   3/14 Echo: Left ventricle: The cavity size was normal. Wall thickness was mildly increased. Systolic function was vigorous. The estimated ejection fraction was 65-70%, by visual assessment. No diagnostic evidence for regional wall motion abnormalities. Unable to assess LV diastolic function.   3/16 Pulmonology: Acute hypoxemic respiratory failure: multifactorial with AECOPD in setting of influenza with associated superimposed bacterial PNA, A.Fib with RVR, and some pulmonary edema all contributing   Risk Factors: History of CHF, COPD, YAMILET, Influenza A, Afib w/RVR    Treatment: Lasix 40 mg IV on 3/13, Lasix 20 mg IV on 3/14 x 2 doses, Lasix 20 mg IV on 3/15    For questions regarding this query, please contact Lead Clinical :   Maira Chun RN, CCDS   Cell# [phone]                          Thank you!

## 2024-03-19 NOTE — PLAN OF CARE
Assumed care for pt. At 1930. Pt. Sitting up in chair with spouse at bedside. Denies pain. Shortness of breath noted with exertion. Spo2 maintained >90% on room air. Up to bathroom with walker and standby assist. A&Ox4. Afib on tele with HR anywhere from 's even at rest. HR occasionally spikes to 140-150's non-sustained. BP stable, 136/89. Plan for possible discharge pending HR control. Patient is aware of the plan of care for the night. Extra strength Tylenol given for arthritic shoulder pain to right arm. Will continue to monitor.     Problem: Patient/Family Goals  Goal: Patient/Family Long Term Goal  Description: Patient's Long Term Goal: DC to home     Interventions:  - follow plan of care        - See additional Care Plan goals for specific interventions  Outcome: Progressing  Goal: Patient/Family Short Term Goal  Description: Patient's Short Term Goal: get better soon     Interventions:   - meds    - See additional Care Plan goals for specific interventions  Outcome: Progressing     Problem: CARDIOVASCULAR - ADULT  Goal: Maintains optimal cardiac output and hemodynamic stability  Description: INTERVENTIONS:  - Monitor vital signs, rhythm, and trends  - Monitor for bleeding, hypotension and signs of decreased cardiac output  - Evaluate effectiveness of vasoactive medications to optimize hemodynamic stability  - Monitor arterial and/or venous puncture sites for bleeding and/or hematoma  - Assess quality of pulses, skin color and temperature  - Assess for signs of decreased coronary artery perfusion - ex. Angina  - Evaluate fluid balance, assess for edema, trend weights  Outcome: Not Progressing  Goal: Absence of cardiac arrhythmias or at baseline  Description: INTERVENTIONS:  - Continuous cardiac monitoring, monitor vital signs, obtain 12 lead EKG if indicated  - Evaluate effectiveness of antiarrhythmic and heart rate control medications as ordered  - Initiate emergency measures for life threatening  arrhythmias  - Monitor electrolytes and administer replacement therapy as ordered  Outcome: Not Progressing     Problem: RESPIRATORY - ADULT  Goal: Achieves optimal ventilation and oxygenation  Description: INTERVENTIONS:  - Assess for changes in respiratory status  - Assess for changes in mentation and behavior  - Position to facilitate oxygenation and minimize respiratory effort  - Oxygen supplementation based on oxygen saturation or ABGs  - Provide Smoking Cessation handout, if applicable  - Encourage broncho-pulmonary hygiene including cough, deep breathe, Incentive Spirometry  - Assess the need for suctioning and perform as needed  - Assess and instruct to report SOB or any respiratory difficulty  - Respiratory Therapy support as indicated  - Manage/alleviate anxiety  - Monitor for signs/symptoms of CO2 retention  Outcome: Progressing     Problem: GASTROINTESTINAL - ADULT  Goal: Minimal or absence of nausea and vomiting  Description: INTERVENTIONS:  - Maintain adequate hydration with IV or PO as ordered and tolerated  - Nasogastric tube to low intermittent suction as ordered  - Evaluate effectiveness of ordered antiemetic medications  - Provide nonpharmacologic comfort measures as appropriate  - Advance diet as tolerated, if ordered  - Obtain nutritional consult as needed  - Evaluate fluid balance  Outcome: Progressing  Goal: Maintains or returns to baseline bowel function  Description: INTERVENTIONS:  - Assess bowel function  - Maintain adequate hydration with IV or PO as ordered and tolerated  - Evaluate effectiveness of GI medications  - Encourage mobilization and activity  - Obtain nutritional consult as needed  - Establish a toileting routine/schedule  - Consider collaborating with pharmacy to review patient's medication profile  Outcome: Progressing     Problem: GENITOURINARY - ADULT  Goal: Absence of urinary retention  Description: INTERVENTIONS:  - Assess patient’s ability to void and empty bladder  -  Monitor intake/output and perform bladder scan as needed  - Follow urinary retention protocol/standard of care  - Consider collaborating with pharmacy to review patient's medication profile  - Implement strategies to promote bladder emptying  Outcome: Progressing     Problem: METABOLIC/FLUID AND ELECTROLYTES - ADULT  Goal: Glucose maintained within prescribed range  Description: INTERVENTIONS:  - Monitor Blood Glucose as ordered  - Assess for signs and symptoms of hyperglycemia and hypoglycemia  - Administer ordered medications to maintain glucose within target range  - Assess barriers to adequate nutritional intake and initiate nutrition consult as needed  - Instruct patient on self management of diabetes  Outcome: Progressing  Goal: Electrolytes maintained within normal limits  Description: INTERVENTIONS:  - Monitor labs and rhythm and assess patient for signs and symptoms of electrolyte imbalances  - Administer electrolyte replacement as ordered  - Monitor response to electrolyte replacements, including rhythm and repeat lab results as appropriate  - Fluid restriction as ordered  - Instruct patient on fluid and nutrition restrictions as appropriate  Outcome: Progressing  Goal: Hemodynamic stability and optimal renal function maintained  Description: INTERVENTIONS:  - Monitor labs and assess for signs and symptoms of volume excess or deficit  - Monitor intake, output and patient weight  - Monitor urine specific gravity, serum osmolarity and serum sodium as indicated or ordered  - Monitor response to interventions for patient's volume status, including labs, urine output, blood pressure (other measures as available)  - Encourage oral intake as appropriate  - Instruct patient on fluid and nutrition restrictions as appropriate  Outcome: Progressing     Problem: HEMATOLOGIC - ADULT  Goal: Free from bleeding injury  Description: (Example usage: patient with low platelets)  INTERVENTIONS:  - Avoid intramuscular  injections, enemas and rectal medication administration  - Ensure safe mobilization of patient  - Hold pressure on venipuncture sites to achieve adequate hemostasis  - Assess for signs and symptoms of internal bleeding  - Monitor lab trends  - Patient is to report abnormal signs of bleeding to staff  - Avoid use of toothpicks and dental floss  - Use electric shaver for shaving  - Use soft bristle tooth brush  - Limit straining and forceful nose blowing  Outcome: Progressing     Problem: PAIN - ADULT  Goal: Verbalizes/displays adequate comfort level or patient's stated pain goal  Description: INTERVENTIONS:  - Encourage pt to monitor pain and request assistance  - Assess pain using appropriate pain scale  - Administer analgesics based on type and severity of pain and evaluate response  - Implement non-pharmacological measures as appropriate and evaluate response  - Consider cultural and social influences on pain and pain management  - Manage/alleviate anxiety  - Utilize distraction and/or relaxation techniques  - Monitor for opioid side effects  - Notify MD/LIP if interventions unsuccessful or patient reports new pain  - Anticipate increased pain with activity and pre-medicate as appropriate  Outcome: Progressing     Problem: SAFETY ADULT - FALL  Goal: Free from fall injury  Description: INTERVENTIONS:  - Assess pt frequently for physical needs  - Identify cognitive and physical deficits and behaviors that affect risk of falls.  - [place] fall precautions as indicated by assessment.  - Educate pt/family on patient safety including physical limitations  - Instruct pt to call for assistance with activity based on assessment  - Modify environment to reduce risk of injury  - Provide assistive devices as appropriate  - Consider OT/PT consult to assist with strengthening/mobility  - Encourage toileting schedule  Outcome: Progressing

## 2024-03-19 NOTE — PROGRESS NOTES
[facility]   part of [facility]     Hospitalist Progress Note     Ella Berry Patient Status:  Inpatient    1945 MRN [id number]   Location [facility] 2NE-A Attending Erin Beckford MD   Hosp Day # 6 PCP None Pcp     Chief Complaint: shortness of breath    Subjective:     Rates still elevated    Objective:    Review of Systems:   A comprehensive review of systems was completed; pertinent positive and negatives stated in subjective.    Vital signs:  Temp:  [97.5 °F (36.4 °C)-98 °F (36.7 °C)] 98 °F (36.7 °C)  Pulse:  [] 109  Resp:  [18-22] 22  BP: (136-149)/(72-92) 140/92  SpO2:  [94 %-97 %] 94 %    Physical Exam:    General: No acute distress  Respiratory: No wheezes, no rhonchi  Cardiovascular: S1, S2, regular rate and rhythm  Abdomen: Soft, Non-tender, non-distended, positive bowel sounds  Neuro: No new focal deficits.   Extremities: No edema      Diagnostic Data:    Labs:  Recent Labs   Lab 24  0438 03/15/24  0400 24  0506 24  0727 24  0703 24  0458   WBC 18.3* 16.4* 14.5* 8.4  --  7.2   HGB 13.1 11.3* 12.4 12.0  --  11.8*   MCV 98.8 98.0 95.3 99.7  --  98.6   .0 188.0 203.0 160.0 160.0 148.0*  148.0*       Recent Labs   Lab 03/15/24  0400 03/15/24  1622 24  0506 24  1516 24  0727 24  0703 24  0458   *  --  148*   < > 153* 139* 147*   BUN 31*  --  30*   < > 18 18 22   CREATSERUM 1.02  --  0.91   < > 0.86 0.79 0.74   CA 8.9  --  8.7   < > 9.3 9.1 9.6   ALB 2.8*  --  2.9*  --  3.0*  --   --    *  --  133*   < > 138 135* 134*   K 3.4*   < > 4.0   < > 4.1 4.0 4.2     --  106   < > 108 108 107   CO2 23.0  --  23.0   < > 25.0 23.0 23.0   ALKPHO 108  --  117  --  111  --   --    *  --  174*  --  92*  --   --    *  --  370*  --  284*  --   --    BILT 0.5  --  0.6  --  0.6  --   --    TP 5.9*  --  6.4  --  6.5  --   --     < > = values in this interval not displayed.       Estimated  Creatinine Clearance: 48 mL/min (based on SCr of 0.74 mg/dL).    Recent Labs   Lab 03/13/24  1049   TROPHS 10       No results for input(s): \"PTP\", \"INR\" in the last 168 hours.         Microbiology    Hospital Encounter on 03/13/24   1. Blood Culture     Status: None    Collection Time: 03/13/24 11:06 AM    Specimen: Blood,peripheral   Result Value Ref Range    Blood Culture Result No Growth 5 Days N/A         Imaging: Reviewed in Epic.    Medications:    [START ON 3/20/2024] dilTIAZem ER  240 mg Oral Daily    dilTIAZem  60 mg Oral 4 times per day    ipratropium-albuterol  3 mL Nebulization 4 times per day    apixaban  5 mg Oral BID    metoprolol tartrate  100 mg Oral 2x Daily(Beta Blocker)    adenosine  6 mg Intravenous Once    adenosine  12 mg Intravenous Once    fluticasone-umeclidin-vilant  1 puff Inhalation Daily    predniSONE  40 mg Oral Daily with breakfast    ampicillin-sulbactam  3 g Intravenous q6h    gabapentin  300 mg Oral Noon    gabapentin  600 mg Oral Nightly    pramipexole  0.125 mg Oral 5x Daily    levothyroxine  88 mcg Oral Before breakfast    pantoprazole  40 mg Intravenous QAM AC    Or    pantoprazole  40 mg Oral QAM AC       Assessment & Plan:      #New on set A.fib with RVR  - BB, and oral cardizem  -Cardizem dose increased  -heparin dced, started on eliquis  -Tele monitoring  -ECHO reviewed from 3/14    #Acute COPD exacerbation  #Influenza  -tamiflu  -steroids  -nebs    # YAMILET    # Elevated LFTs  -improving     Needs better rate control, if unable to achieve with medications may require SALEEM/ DCCV    Erin Beckford MD    Supplementary Documentation:     Quality:  DVT Mechanical Prophylaxis:   SCDs,    DVT Pharmacologic Prophylaxis   Medication    apixaban (Eliquis) tab 5 mg                Code Status: Full Code  Wylie: External urinary catheter in place  Wylie Duration (in days):   Central line:    STORM: 3/20/2024    Discharge is dependent on: progress  At this point Ms. Berry is expected to be  discharge to: home    The 21st Century Cures Act makes medical notes like these available to patients in the interest of transparency. Please be advised this is a medical document. Medical documents are intended to carry relevant information, facts as evident, and the clinical opinion of the practitioner. The medical note is intended as peer to peer communication and may appear blunt or direct. It is written in medical language and may contain abbreviations or verbiage that are unfamiliar.

## 2024-03-19 NOTE — CDS QUERY
CLINICAL DOCUMENTATION CLARIFICATION     Dear Dr. Issa,    Please clarify the type of pneumonia being treated.    [   ] Aspiration pneumonia  [  x ] Bacterial pneumonia (please specify type, if known): ________________  [   ] Other (please specify):       Clinical Indicators:     3/13 CT Chest: Perihilar distribution of interlobular septal thickening and intervening ground-glass and patchy consolidative opacities.  The distribution of disease favors pulmonary edema with interstitial and alveolar components, differential including multifocal   pneumonia.  Trace bilateral pleural effusions.     3/13 Intensivist Consult: developed worsening resp distress over the last 24hrs. Pt worsened despite use of home neb and came to ED where she was found to be in resp distress. Pt placed on BIPAP for increased WOB. Went to CTA to r/o PE bc of elevated ddimer and while there vomited, worsening her condition. Pt also noted to be + for influenza. Upon my arrival, pt is in distress with RR in 40's and some confusion.   - add zosyn given risk of aspiration while in CT     3/15 Hospitalist: Acute respiratory failure - due to influenza with underlying COPD  -steroids -Antibiotics -tamiflu -nebs    3/17 Pulmonology: Acute hypoxemic respiratory failure: multifactorial with AECOPD in setting of influenza with associated superimposed bacterial PNA, A.Fib with RVR, and some pulmonary edema all contributing.   PNA: concern for aspiration given emesis while in CT  -PCT significantly elevated  -sputum culture with normal respiratory richar  -unasyn (3/15 -  )      Risk Factors: Influenza A, COPD exacerbation, possible aspiration event in CT.       Treatment: Unasyn 3 g IV q 6 hrs 3/15 - present       For questions regarding this query, please contact Lead Clinical :   Maira Chun RN, CCDS   Cell# [phone]                          Thank you!

## 2024-03-19 NOTE — PLAN OF CARE
Received patient at 0730. Alert and Oriented x4. Tele Rhythm A Fib, Heart Rates between ) O2 saturation 96% on room air. Expiratory Wheezing. Dyspnea with exertion. No C/O chest pain, skin is dry and intact.     Per Cardiologist PA (Ruben Santoro)  Cardizem to 240mg daily. Possible cardioversion 03/20/24.     Bed is locked and in low position. Call light and personal items within reach.  Pt voiding with no issues. Reviewed plan of care with patient and , they verbalize understanding.

## 2024-03-20 VITALS
BODY MASS INDEX: 32.59 KG/M2 | WEIGHT: 172.63 LBS | HEART RATE: 143 BPM | TEMPERATURE: 98 F | OXYGEN SATURATION: 96 % | HEIGHT: 61 IN | SYSTOLIC BLOOD PRESSURE: 130 MMHG | DIASTOLIC BLOOD PRESSURE: 78 MMHG | RESPIRATION RATE: 18 BRPM

## 2024-03-20 PROCEDURE — 99239 HOSP IP/OBS DSCHRG MGMT >30: CPT | Performed by: HOSPITALIST

## 2024-03-20 RX ORDER — DILTIAZEM HYDROCHLORIDE 240 MG/1
240 CAPSULE, COATED, EXTENDED RELEASE ORAL DAILY
Qty: 30 CAPSULE | Refills: 0 | Status: SHIPPED | OUTPATIENT
Start: 2024-03-20

## 2024-03-20 NOTE — PROGRESS NOTES
Pt discharged home.Tele removed, IV removed.F/U instructions provided and discussed. Pt and family verbalized understanding. All meds the script was sent to the pharmacy.Pt wheeled down by staff with kandace. Discharge instructions including medications and follow ups are given. Pt left denying any complains of pain, malaise, or cardiac symptoms.All needs met by staff.

## 2024-03-20 NOTE — PROGRESS NOTES
Progress Note  Ella Berry Patient Status:  Inpatient    1945 MRN [id number]   [facility] 2NE-A Attending Erin Beckford MD   Hosp Day # 7 PCP None Pcp     Subjective:  Mrs. Berry feels well today. She notes that she is improving every day. Denies dyspnea or orthopnea. Ambulating well. Heart rates in the 90's.    Objective:  Physical Exam:   /84   Pulse 92   Temp 97.9 °F (36.6 °C) (Oral)   Resp 18   Ht 5' 1\" (1.549 m)   Wt 172 lb 9.9 oz (78.3 kg)   SpO2 95%   BMI 32.62 kg/m²   Temp (24hrs), Av.7 °F (36.5 °C), Min:97.4 °F (36.3 °C), Max:98 °F (36.7 °C)       Intake/Output Summary (Last 24 hours) at 3/20/2024 0843  Last data filed at 3/20/2024 0606  Gross per 24 hour   Intake 1100 ml   Output 600 ml   Net 500 ml     Wt Readings from Last 3 Encounters:   24 172 lb 9.9 oz (78.3 kg)     Telemetry: Afib with ventricular rates in the 90's  General: Alert and oriented in no apparent distress seated comfortably in the bedside chair.  HEENT: No focal deficits.  Neck: No JVD, carotids 2+ no bruits.  Cardiac: Irregularly irregular, S1, S2 normal, no murmur, rub or gallop.  Lungs: expiratory wheezing intermittently although improving with prolonged expiratory phase and crackles in the bases bilaterally. Breathing comfortably on room air.  Abdomen: Soft, non-tender.   Extremities: Without clubbing, cyanosis or edema.  Peripheral pulses are 2+.  Neurologic: Alert and oriented, normal affect.  Skin: Warm and dry.        Intake/Output:    Intake/Output Summary (Last 24 hours) at 3/20/2024 0843  Last data filed at 3/20/2024 0606  Gross per 24 hour   Intake 1100 ml   Output 600 ml   Net 500 ml       Last 3 Weights   24 2000 172 lb 9.9 oz (78.3 kg)   24 1030 160 lb (72.6 kg)       Labs:  Recent Labs   Lab 24  0727 24  0703 24  0458   * 139* 147*   BUN 18 18 22   CREATSERUM 0.86 0.79 0.74   EGFRCR 70 77 83   CA 9.3 9.1 9.6    135* 134*   K  4.1 4.0 4.2    108 107   CO2 25.0 23.0 23.0     Recent Labs   Lab 03/15/24  0400 03/16/24  0506 03/17/24  0727 03/18/24  0703 03/19/24  0458   RBC 3.54* 3.86 3.68*  --  3.58*   HGB 11.3* 12.4 12.0  --  11.8*   HCT 34.7* 36.8 36.7  --  35.3   MCV 98.0 95.3 99.7  --  98.6   MCH 31.9 32.1 32.6  --  33.0   MCHC 32.6 33.7 32.7  --  33.4   RDW 16.1 16.1 16.2  --  15.8   NEPRELIM 15.61* 12.83* 6.34  --   --    WBC 16.4* 14.5* 8.4  --  7.2   .0 203.0 160.0 160.0 148.0*  148.0*         Recent Labs   Lab 03/13/24  1049   TROPHS 10       Diagnostics:     ECHOCARDIOGRAM 3/14/2024:  1. Left ventricle: The cavity size was normal. Wall thickness was mildly      increased. Systolic function was vigorous. The estimated ejection      fraction was 65-70%, by visual assessment. No diagnostic evidence for      regional wall motion abnormalities. Unable to assess LV diastolic      function.   2. Left atrium: The atrium was mildly dilated.   3. Aortic valve: There was mild regurgitation.   4. Mitral valve: The annulus was mildly to moderately calcified.   5. Pulmonary arteries: Systolic pressure was mildly increased, in the range of 30mm Hg to 35mm Hg.   Impressions:  No previous study from [facility] was   available for comparison.       Medications:   dilTIAZem ER  240 mg Oral Daily    ipratropium-albuterol  3 mL Nebulization 4 times per day    apixaban  5 mg Oral BID    metoprolol tartrate  100 mg Oral 2x Daily(Beta Blocker)    adenosine  6 mg Intravenous Once    adenosine  12 mg Intravenous Once    fluticasone-umeclidin-vilant  1 puff Inhalation Daily    predniSONE  40 mg Oral Daily with breakfast    gabapentin  300 mg Oral Noon    gabapentin  600 mg Oral Nightly    pramipexole  0.125 mg Oral 5x Daily    levothyroxine  88 mcg Oral Before breakfast    pantoprazole  40 mg Intravenous QAM AC    Or    pantoprazole  40 mg Oral QAM AC         Assessment/Plan:  Mrs. Berry is a 80 yo female with a PMH of COPD,  HTN, and chronic LBBB. She presented to the ED on 3/13 with dyspnea. Her D-dimer was elevated and she was positive for influenza A. CTA negative for PE. Also noted to have pulmonary edema. We were consulted for atrial fibrillation with RVR.      Acute hypoxic respiratory failure/influenza/PNA  Steroids - completed tamiflu  Pulmonary team following  New onset afib RVR currently rate 's and on Eliquis  Currently on Diltiazem  mg daily and metoprolol tartrate 100mg BID  Adjusted from Eliquis to Xarelto for insurance/payment issues  HTN  At goal today after up-titration of diltiazem  Chronic LBBB  COPD/Asthma  Pulm following    PLAN  Continue diltiazem to 240 mg daily  Continue metoprolol 100 mg BID  Continue Xarelto 20 mg daily outpatient  Pulmonary following  Will follow with her outpatient cardiologist Dr. Mccauley in [place] within two weeks as reviewed  We will follow peripherally from a cardiology perspective. Please let us know if we can assist further      Ruben Santoro PA-C  3/20/2024  10:34 AM    79-year-old female who presented with acute hypoxic respiratory failure 2/2 influenza A and PNA.  Clinically slowly improving.  Course C/B new onset A-fib with rapid ventricular response.  Plan for rate control strategy with current regimen including diltiazem 2040 mg daily and metoprolol tartrate 100 mg twice daily.  Started on DOAC.  Heart rate control adequate at this time without related symptomology.  Okay to discharge from cardiology perspective with outpatient follow-up.      Balta Patel DO  Cardiologist  [facility]  3/20/2024 11:38 AM      Note to the patient: The 21st Century Cures Act makes medical notes like these available to patients in the interest of transparency. However, be advised that this is a medical document. It is intended as peer to peer communication. It is written in medical language and may contain abbreviations or verbiage that are unfamiliar. It may appear  blunt or direct. Medical documents are intended to carry relevant information, facts as evident, and clinical opinion of the practitioner.     Disclaimer: Components of this note were documented using voice recognition system and are subject to errors not corrected at proofreading. Contact the author of this note for any clarifications.

## 2024-03-20 NOTE — PLAN OF CARE
Pt is received around 0730. A&O x 4, denies a chest pain and dizziness. Lungs are diminished/ expiratory wheezing, RA. A.fib on tele monitor and rates are controlled. Last BM 3/19, bowel sounds active x 4, abdomen soft and non-tender, continent of B&B. Call light within the reach. Pt updated with plan of care.             Problem: Patient/Family Goals  Goal: Patient/Family Long Term Goal  Description: Patient's Long Term Goal: Stay out of the hospital    Interventions:  - Follow with PCP  -Follow up with cardiologist  -Take medication as prescribed        - See additional Care Plan goals for specific interventions  Outcome: Progressing  Goal: Patient/Family Short Term Goal  Description: Patient's Short Term Goal: get better soon     Interventions:   - meds  -pain control  -assessment    - See additional Care Plan goals for specific interventions  Outcome: Progressing     Problem: CARDIOVASCULAR - ADULT  Goal: Maintains optimal cardiac output and hemodynamic stability  Description: INTERVENTIONS:  - Monitor vital signs, rhythm, and trends  - Monitor for bleeding, hypotension and signs of decreased cardiac output  - Evaluate effectiveness of vasoactive medications to optimize hemodynamic stability  - Monitor arterial and/or venous puncture sites for bleeding and/or hematoma  - Assess quality of pulses, skin color and temperature  - Assess for signs of decreased coronary artery perfusion - ex. Angina  - Evaluate fluid balance, assess for edema, trend weights  Outcome: Progressing  Goal: Absence of cardiac arrhythmias or at baseline  Description: INTERVENTIONS:  - Continuous cardiac monitoring, monitor vital signs, obtain 12 lead EKG if indicated  - Evaluate effectiveness of antiarrhythmic and heart rate control medications as ordered  - Initiate emergency measures for life threatening arrhythmias  - Monitor electrolytes and administer replacement therapy as ordered  Outcome: Progressing     Problem: RESPIRATORY -  ADULT  Goal: Achieves optimal ventilation and oxygenation  Description: INTERVENTIONS:  - Assess for changes in respiratory status  - Assess for changes in mentation and behavior  - Position to facilitate oxygenation and minimize respiratory effort  - Oxygen supplementation based on oxygen saturation or ABGs  - Provide Smoking Cessation handout, if applicable  - Encourage broncho-pulmonary hygiene including cough, deep breathe, Incentive Spirometry  - Assess the need for suctioning and perform as needed  - Assess and instruct to report SOB or any respiratory difficulty  - Respiratory Therapy support as indicated  - Manage/alleviate anxiety  - Monitor for signs/symptoms of CO2 retention  Outcome: Progressing     Problem: GASTROINTESTINAL - ADULT  Goal: Minimal or absence of nausea and vomiting  Description: INTERVENTIONS:  - Maintain adequate hydration with IV or PO as ordered and tolerated  - Nasogastric tube to low intermittent suction as ordered  - Evaluate effectiveness of ordered antiemetic medications  - Provide nonpharmacologic comfort measures as appropriate  - Advance diet as tolerated, if ordered  - Obtain nutritional consult as needed  - Evaluate fluid balance  Outcome: Progressing  Goal: Maintains or returns to baseline bowel function  Description: INTERVENTIONS:  - Assess bowel function  - Maintain adequate hydration with IV or PO as ordered and tolerated  - Evaluate effectiveness of GI medications  - Encourage mobilization and activity  - Obtain nutritional consult as needed  - Establish a toileting routine/schedule  - Consider collaborating with pharmacy to review patient's medication profile  Outcome: Progressing     Problem: GENITOURINARY - ADULT  Goal: Absence of urinary retention  Description: INTERVENTIONS:  - Assess patient’s ability to void and empty bladder  - Monitor intake/output and perform bladder scan as needed  - Follow urinary retention protocol/standard of care  - Consider collaborating  with pharmacy to review patient's medication profile  - Implement strategies to promote bladder emptying  Outcome: Progressing     Problem: METABOLIC/FLUID AND ELECTROLYTES - ADULT  Goal: Glucose maintained within prescribed range  Description: INTERVENTIONS:  - Monitor Blood Glucose as ordered  - Assess for signs and symptoms of hyperglycemia and hypoglycemia  - Administer ordered medications to maintain glucose within target range  - Assess barriers to adequate nutritional intake and initiate nutrition consult as needed  - Instruct patient on self management of diabetes  Outcome: Progressing  Goal: Electrolytes maintained within normal limits  Description: INTERVENTIONS:  - Monitor labs and rhythm and assess patient for signs and symptoms of electrolyte imbalances  - Administer electrolyte replacement as ordered  - Monitor response to electrolyte replacements, including rhythm and repeat lab results as appropriate  - Fluid restriction as ordered  - Instruct patient on fluid and nutrition restrictions as appropriate  Outcome: Progressing  Goal: Hemodynamic stability and optimal renal function maintained  Description: INTERVENTIONS:  - Monitor labs and assess for signs and symptoms of volume excess or deficit  - Monitor intake, output and patient weight  - Monitor urine specific gravity, serum osmolarity and serum sodium as indicated or ordered  - Monitor response to interventions for patient's volume status, including labs, urine output, blood pressure (other measures as available)  - Encourage oral intake as appropriate  - Instruct patient on fluid and nutrition restrictions as appropriate  Outcome: Progressing     Problem: HEMATOLOGIC - ADULT  Goal: Free from bleeding injury  Description: (Example usage: patient with low platelets)  INTERVENTIONS:  - Avoid intramuscular injections, enemas and rectal medication administration  - Ensure safe mobilization of patient  - Hold pressure on venipuncture sites to achieve  adequate hemostasis  - Assess for signs and symptoms of internal bleeding  - Monitor lab trends  - Patient is to report abnormal signs of bleeding to staff  - Avoid use of toothpicks and dental floss  - Use electric shaver for shaving  - Use soft bristle tooth brush  - Limit straining and forceful nose blowing  Outcome: Progressing     Problem: PAIN - ADULT  Goal: Verbalizes/displays adequate comfort level or patient's stated pain goal  Description: INTERVENTIONS:  - Encourage pt to monitor pain and request assistance  - Assess pain using appropriate pain scale  - Administer analgesics based on type and severity of pain and evaluate response  - Implement non-pharmacological measures as appropriate and evaluate response  - Consider cultural and social influences on pain and pain management  - Manage/alleviate anxiety  - Utilize distraction and/or relaxation techniques  - Monitor for opioid side effects  - Notify MD/LIP if interventions unsuccessful or patient reports new pain  - Anticipate increased pain with activity and pre-medicate as appropriate  Outcome: Progressing     Problem: SAFETY ADULT - FALL  Goal: Free from fall injury  Description: INTERVENTIONS:  - Assess pt frequently for physical needs  - Identify cognitive and physical deficits and behaviors that affect risk of falls.  - [place] fall precautions as indicated by assessment.  - Educate pt/family on patient safety including physical limitations  - Instruct pt to call for assistance with activity based on assessment  - Modify environment to reduce risk of injury  - Provide assistive devices as appropriate  - Consider OT/PT consult to assist with strengthening/mobility  - Encourage toileting schedule  Outcome: Progressing

## 2024-03-20 NOTE — DISCHARGE SUMMARY
[facility] HOSPITALIST  DISCHARGE SUMMARY     Ella Berry Patient Status:  Inpatient    1945 MRN [id number]   [facility] 2NE-A Attending Alexsander Issa DO   Hosp Day # 7 PCP None Pcp     Date of Admission: 3/13/2024  Date of Discharge:   3/20/2024    Discharge Disposition: Home    Discharge Diagnosis:  New Afib with RVR  Acute COPD exacerbation secondary to influenza  Transaminitis  YAMILET    History of Present Illness: Ella Berry is a 79 year old female with medical history of COPD, essential hypertension and chronic LBBB who comes to the ER with complaints of shortness pf breath.  She is visiting some friends in the area and is originally from [place].  She was out at a doctors appointment with her friend today and was noted to be short of breath and not herself so she was brought to the ER for further evaluation.  In the ER she was given an hour long neb without improvement and started on a BIPAP.  She also tested positive for influenza     Brief Synopsis:     Expand All Collapse All    [facility]   part of [facility]     Hospitalist Progress Note            Ella Berry Patient Status:  Inpatient    1945 MRN [id number]   [facility] 2NE-A Attending Erin Beckford MD   Hosp Day # 6 PCP None Pcp      Chief Complaint: shortness of breath        Subjective:   Rates still elevated           Objective:   Review of Systems:   A comprehensive review of systems was completed; pertinent positive and negatives stated in subjective.     Vital signs:  Temp:  [97.5 °F (36.4 °C)-98 °F (36.7 °C)] 98 °F (36.7 °C)  Pulse:  [] 109  Resp:  [18-22] 22  BP: (136-149)/(72-92) 140/92  SpO2:  [94 %-97 %] 94 %     Physical Exam:    General: No acute distress  Respiratory: No wheezes, no rhonchi  Cardiovascular: S1, S2, regular rate and rhythm  Abdomen: Soft, Non-tender, non-distended, positive bowel sounds  Neuro: No new focal deficits.   Extremities: No edema         Diagnostic Data:    Labs:           Recent Labs   Lab 03/14/24  0438 03/15/24  0400 03/16/24  0506 03/17/24  0727 03/18/24  0703 03/19/24  0458   WBC 18.3* 16.4* 14.5* 8.4  --  7.2   HGB 13.1 11.3* 12.4 12.0  --  11.8*   MCV 98.8 98.0 95.3 99.7  --  98.6   .0 188.0 203.0 160.0 160.0 148.0*  148.0*                   Recent Labs   Lab 03/15/24  0400 03/15/24  1622 03/16/24  0506 03/16/24  1516 03/17/24  0727 03/18/24 0703 03/19/24  0458   *  --  148*   < > 153* 139* 147*   BUN 31*  --  30*   < > 18 18 22   CREATSERUM 1.02  --  0.91   < > 0.86 0.79 0.74   CA 8.9  --  8.7   < > 9.3 9.1 9.6   ALB 2.8*  --  2.9*  --  3.0*  --   --    *  --  133*   < > 138 135* 134*   K 3.4*   < > 4.0   < > 4.1 4.0 4.2     --  106   < > 108 108 107   CO2 23.0  --  23.0   < > 25.0 23.0 23.0   ALKPHO 108  --  117  --  111  --   --    *  --  174*  --  92*  --   --    *  --  370*  --  284*  --   --    BILT 0.5  --  0.6  --  0.6  --   --    TP 5.9*  --  6.4  --  6.5  --   --     < > = values in this interval not displayed.         Estimated Creatinine Clearance: 48 mL/min (based on SCr of 0.74 mg/dL).         Recent Labs   Lab 03/13/24  1049   TROPHS 10         No results for input(s): \"PTP\", \"INR\" in the last 168 hours.        Microbiology           Hospital Encounter on 03/13/24   1. Blood Culture     Status: None     Collection Time: 03/13/24 11:06 AM     Specimen: Blood,peripheral   Result Value Ref Range     Blood Culture Result No Growth 5 Days N/A            Imaging: Reviewed in Epic.     Medications:    [START ON 3/20/2024] dilTIAZem ER  240 mg Oral Daily    dilTIAZem  60 mg Oral 4 times per day    ipratropium-albuterol  3 mL Nebulization 4 times per day    apixaban  5 mg Oral BID    metoprolol tartrate  100 mg Oral 2x Daily(Beta Blocker)    adenosine  6 mg Intravenous Once    adenosine  12 mg Intravenous Once    fluticasone-umeclidin-vilant  1 puff Inhalation Daily    predniSONE  40 mg Oral  Daily with breakfast    ampicillin-sulbactam  3 g Intravenous q6h    gabapentin  300 mg Oral Noon    gabapentin  600 mg Oral Nightly    pramipexole  0.125 mg Oral 5x Daily    levothyroxine  88 mcg Oral Before breakfast    pantoprazole  40 mg Intravenous QAM AC     Or    pantoprazole  40 mg Oral QAM AC               Assessment & Plan:   #New on set A.fib with RVR  - BB, cardizem  -heparin dced, started on eliquis  -Tele monitoring  -ECHO reviewed from 3/14     #Acute COPD exacerbation  #Influenza  #Presumed aspiration pneumonia  -tamiflu  -Steroids   -nebs  -Empiric antibiotics per pulmonary      # YAMILET     # Elevated LFTs  -Suspect viral induced  -Can f/u with primary OP    #Disposition  -Stable for DC home       Lace+ Score: 62  59-90 High Risk  29-58 Medium Risk  0-28   Low Risk  Patient was referred to the [facility].    TCM Follow-Up Recommendation:  LACE > 58: High Risk of readmission after discharge from the hospital.      Procedures during hospitalization:   None    Incidental or significant findings and recommendations (brief descriptions):  None    Lab/Test results pending at Discharge:   None    Consultants:  Cardiology    Discharge Medication List:     Discharge Medications        START taking these medications        Instructions Prescription details   amoxicillin clavulanate 875-125 MG Tabs  Commonly known as: Augmentin      Take 1 tablet by mouth 2 (two) times daily for 3 days.   Stop taking on: March 21, 2024  Quantity: 6 tablet  Refills: 0     dilTIAZem HCl ER Coated Beads 180 MG Cp24  Commonly known as: CARDIZEM CD      Take 1 capsule (180 mg total) by mouth daily.   Quantity: 90 capsule  Refills: 0     dilTIAZem  MG Cp24  Commonly known as: CardIZEM CD      Take 1 capsule (240 mg total) by mouth daily.   Quantity: 30 capsule  Refills: 0     famotidine 20 MG Tabs  Commonly known as: Pepcid      Take 1 tablet (20 mg total) by mouth 2 (two) times daily.   Quantity: 14  tablet  Refills: 0     metoprolol tartrate 100 MG Tabs  Commonly known as: Lopressor      Take 1 tablet (100 mg total) by mouth 2x Daily(Beta Blocker).   Quantity: 180 tablet  Refills: 0     predniSONE 10 MG Tabs  Commonly known as: Deltasone      40mg for 3 days, 30mg for 3 days, 20mg for 3 days, 10mg for 3 days   Quantity: 30 tablet  Refills: 0     rivaroxaban 20 MG Tabs  Commonly known as: Xarelto      Take 1 tablet (20 mg total) by mouth nightly.   Quantity: 30 tablet  Refills: 0            CHANGE how you take these medications        Instructions Prescription details   Tiotropium Bromide Monohydrate 1.25 MCG/ACT Aers  What changed: how much to take      Inhale 1.25 mcg into the lungs daily.   Quantity: 1 each  Refills: 0            CONTINUE taking these medications        Instructions Prescription details   gabapentin 300 MG Caps  Commonly known as: Neurontin      Take 1 capsule (300 mg total) by mouth Noon.   Refills: 0     gabapentin 600 MG Tabs  Commonly known as: Neurontin      Take 1 tablet (600 mg total) by mouth at bedtime.   Refills: 0     levothyroxine 88 MCG Tabs  Commonly known as: Synthroid      Take 1 tablet (88 mcg total) by mouth before breakfast.   Refills: 0     pramipexole 0.125 MG Tabs  Commonly known as: Mirapex      Take 1 tablet (0.125 mg total) by mouth 5 (five) times daily.   Refills: 0     zafirlukast 20 MG Tabs  Commonly known as: Accolate      Take 1 tablet (20 mg total) by mouth daily.   Refills: 0            STOP taking these medications      buPROPion  MG Tb24  Commonly known as: Wellbutrin XL        clonazePAM 0.5 MG Tabs  Commonly known as: KlonoPIN        colchicine 0.6 MG Tabs        ferrous sulfate 325 (65 FE) MG Tbec        fesoterodine ER 4 MG Tb24  Commonly known as: Toviaz        hydroxychloroquine 200 MG Tabs  Commonly known as: Plaquenil        leflunomide 10 MG Tabs  Commonly known as: Arava        levETIRAcetam  MG Tb24  Commonly known as: KEPPRA XR         methotrexate 2.5 MG Tabs  Commonly known as: Rheumatrex        Metoprolol Succinate  MG Tb24  Commonly known as: TOPROL-XL        Myrbetriq 50 MG Tb24  Generic drug: Mirabegron ER        pantoprazole 40 MG Tbec  Commonly known as: Protonix        valsartan 160 MG Tabs  Commonly known as: Diovan                  Where to Get Your Medications        These medications were sent to [address], [address] [phone], [phone]  [address] [place] [address], [address], [facility] [address]      Phone: [phone]   dilTIAZem  MG Cp24  rivaroxaban 20 MG Tabs       Please  your prescriptions at the location directed by your doctor or nurse    Bring a paper prescription for each of these medications  amoxicillin clavulanate 875-125 MG Tabs  dilTIAZem HCl ER Coated Beads 180 MG Cp24  famotidine 20 MG Tabs  metoprolol tartrate 100 MG Tabs  predniSONE 10 MG Tabs  Tiotropium Bromide Monohydrate 1.25 MCG/ACT Aers         [place]    Follow-up appointment:   Pcp, None  [facility] [id number]    Schedule an appointment as soon as possible for a visit  for post hospital follow up. To establish care with an [facility] primary care physician, call our Physician Referral line at [phone], Monday through Friday from 7 a.m. to 6 p.m. and Saturdays from 7 a.m. to 1 p.m.    [address] [address] [phone]  Schedule an appointment as soon as possible for a visit in 2 week(s)  or follow up with Cartdiologist in [place]    Pulmonologist in [place]    Schedule an appointment as soon as possible for a visit  in 1-2 weeks    Appointments for Next 30 Days 3/20/2024 - 4/19/2024      None            Vital signs:  Temp:  [97.4 °F (36.3 °C)-98 °F (36.7 °C)] 98 °F (36.7 °C)  Pulse:  [] 102  Resp:  [18-20] 18  BP: (124-149)/() 130/78  SpO2:  [94 %-100 %] 95 %    Physical Exam:    General: No acute distress   Lungs:  Diminished but clear.   Cardiovascular: Irregularly irregular.   Abdomen: Soft    -----------------------------------------------------------------------------------------------  PATIENT DISCHARGE INSTRUCTIONS: See electronic chart    Alexsander Issa,     Total time spent on discharge plannin minutes     The  [employer] Cures Act makes medical notes like these available to patients in the interest of transparency. Please be advised this is a medical document. Medical documents are intended to carry relevant information, facts as evident, and the clinical opinion of the practitioner. The medical note is intended as peer to peer communication and may appear blunt or direct. It is written in medical language and may contain abbreviations or verbiage that are unfamiliar.

## 2024-03-20 NOTE — PROGRESS NOTES
Pulmonary Progress Note     Assessment / Plan:  Acute hypoxemic respiratory failure - due to AECOPD/asthma, influenza with superimposed bacterial PNA, atrial fibrillation with RVR, and pulmonary edema. CTA 3/13 was negative for PE but showed GGO with patchy consolidation and interlobular septal thickening   -on RA  -diuretics per cardiology  -rate control of AF per cards  -continue prednisone taper  -continue antibiotics as below  AECOPD/asthma exacerbation - triggered by influenza  -prednisone taper  -trelegy 100 in lieu of home symbicort/spiriva  -BD protocol  -outpatient PFT's recommended.  -follow up with pulmonologist in [place]  PNA - concern for aspiration given emesis while in CT. PCT significantly elevated. Sputum culture with normal respiratory richar  -continue unasyn (3/15- )--> change to augmentin at hospital discharge   Influenza A  -tamiflu completed  Atrial fibrillation with RVR and pulm edema  -rate control per cards; plan is for DCCV tomorrow  -diuretics per cardiology  -anticoagulated w/ eliquis  Dispo  -full code  -okay to DC from pulmonary perspective. Follow-up in one week with her usual pulmonologist      Subjective:  Feels well and wants to go home  Ambulated in the unit with no issues and SaO2 remained >90%    Objective:  Vitals:    03/20/24 0616 03/20/24 0755 03/20/24 1056 03/20/24 1145   BP:  133/84  130/78   BP Location:  Left arm  Left arm   Pulse: 103 92 103 102   Resp:  18  18   Temp:  97.9 °F (36.6 °C)  98 °F (36.7 °C)   TempSrc:  Oral  Oral   SpO2: 95% 95% 96% 95%   Weight:       Height:         Physical Exam:  General: no apparent distress, conversant  Skin: no rash, ulcers or subcutaneous nodules  Eyes: anicteric sclerae, moist conjunctivae  Head, ears, nose, throat: atraumatic, oropharynx clear with moist mucous membranes  Neck: trachea midline with no thyromegaly  Heart: regular rate and rhythm, no murmurs / rubs / gallops  Lungs: clear bilaterally, normal respiratory effort, no  accessory muscle use  Extremities: no edema or cyanosis  Psych: interactive, answering questions appropriately, appropriate affect    Medications:  Reviewed in EMR    Lab Data:  Reviewed in EMR    Imaging:  I independently visualized all relevant chest imaging in PACS and agree with radiology interpretation except where noted.

## 2024-03-20 NOTE — PLAN OF CARE
Patient received sitting up in chair with spouse at bedside. Alert and oriented x 4. Up  SBA. On RA, noted with mild shortness of breath on exertion. Afib on tele with HR in the 's at rest. HR occasionally spikes to 130's non-sustained. Continent of bowel and bladder. Reports of right shoulder/back pain, prn pain medication administered. No complaints of shortness of breath or chest pain/discomfort. POC : Monitor HR, po prednisone. Fall precautions in place. Call light within reach.    Problem: Patient/Family Goals  Goal: Patient/Family Long Term Goal  Description: Patient's Long Term Goal: DC to home     Interventions:  - follow plan of care        - See additional Care Plan goals for specific interventions  Outcome: Progressing  Goal: Patient/Family Short Term Goal  Description: Patient's Short Term Goal: get better soon     Interventions:   - meds    - See additional Care Plan goals for specific interventions  Outcome: Progressing     Problem: CARDIOVASCULAR - ADULT  Goal: Maintains optimal cardiac output and hemodynamic stability  Description: INTERVENTIONS:  - Monitor vital signs, rhythm, and trends  - Monitor for bleeding, hypotension and signs of decreased cardiac output  - Evaluate effectiveness of vasoactive medications to optimize hemodynamic stability  - Monitor arterial and/or venous puncture sites for bleeding and/or hematoma  - Assess quality of pulses, skin color and temperature  - Assess for signs of decreased coronary artery perfusion - ex. Angina  - Evaluate fluid balance, assess for edema, trend weights  Outcome: Progressing  Goal: Absence of cardiac arrhythmias or at baseline  Description: INTERVENTIONS:  - Continuous cardiac monitoring, monitor vital signs, obtain 12 lead EKG if indicated  - Evaluate effectiveness of antiarrhythmic and heart rate control medications as ordered  - Initiate emergency measures for life threatening arrhythmias  - Monitor electrolytes and administer replacement  therapy as ordered  Outcome: Progressing     Problem: RESPIRATORY - ADULT  Goal: Achieves optimal ventilation and oxygenation  Description: INTERVENTIONS:  - Assess for changes in respiratory status  - Assess for changes in mentation and behavior  - Position to facilitate oxygenation and minimize respiratory effort  - Oxygen supplementation based on oxygen saturation or ABGs  - Provide Smoking Cessation handout, if applicable  - Encourage broncho-pulmonary hygiene including cough, deep breathe, Incentive Spirometry  - Assess the need for suctioning and perform as needed  - Assess and instruct to report SOB or any respiratory difficulty  - Respiratory Therapy support as indicated  - Manage/alleviate anxiety  - Monitor for signs/symptoms of CO2 retention  Outcome: Progressing     Problem: GASTROINTESTINAL - ADULT  Goal: Minimal or absence of nausea and vomiting  Description: INTERVENTIONS:  - Maintain adequate hydration with IV or PO as ordered and tolerated  - Nasogastric tube to low intermittent suction as ordered  - Evaluate effectiveness of ordered antiemetic medications  - Provide nonpharmacologic comfort measures as appropriate  - Advance diet as tolerated, if ordered  - Obtain nutritional consult as needed  - Evaluate fluid balance  Outcome: Progressing  Goal: Maintains or returns to baseline bowel function  Description: INTERVENTIONS:  - Assess bowel function  - Maintain adequate hydration with IV or PO as ordered and tolerated  - Evaluate effectiveness of GI medications  - Encourage mobilization and activity  - Obtain nutritional consult as needed  - Establish a toileting routine/schedule  - Consider collaborating with pharmacy to review patient's medication profile  Outcome: Progressing     Problem: GENITOURINARY - ADULT  Goal: Absence of urinary retention  Description: INTERVENTIONS:  - Assess patient’s ability to void and empty bladder  - Monitor intake/output and perform bladder scan as needed  - Follow  urinary retention protocol/standard of care  - Consider collaborating with pharmacy to review patient's medication profile  - Implement strategies to promote bladder emptying  Outcome: Progressing     Problem: METABOLIC/FLUID AND ELECTROLYTES - ADULT  Goal: Glucose maintained within prescribed range  Description: INTERVENTIONS:  - Monitor Blood Glucose as ordered  - Assess for signs and symptoms of hyperglycemia and hypoglycemia  - Administer ordered medications to maintain glucose within target range  - Assess barriers to adequate nutritional intake and initiate nutrition consult as needed  - Instruct patient on self management of diabetes  Outcome: Progressing  Goal: Electrolytes maintained within normal limits  Description: INTERVENTIONS:  - Monitor labs and rhythm and assess patient for signs and symptoms of electrolyte imbalances  - Administer electrolyte replacement as ordered  - Monitor response to electrolyte replacements, including rhythm and repeat lab results as appropriate  - Fluid restriction as ordered  - Instruct patient on fluid and nutrition restrictions as appropriate  Outcome: Progressing  Goal: Hemodynamic stability and optimal renal function maintained  Description: INTERVENTIONS:  - Monitor labs and assess for signs and symptoms of volume excess or deficit  - Monitor intake, output and patient weight  - Monitor urine specific gravity, serum osmolarity and serum sodium as indicated or ordered  - Monitor response to interventions for patient's volume status, including labs, urine output, blood pressure (other measures as available)  - Encourage oral intake as appropriate  - Instruct patient on fluid and nutrition restrictions as appropriate  Outcome: Progressing     Problem: HEMATOLOGIC - ADULT  Goal: Free from bleeding injury  Description: (Example usage: patient with low platelets)  INTERVENTIONS:  - Avoid intramuscular injections, enemas and rectal medication administration  - Ensure safe  mobilization of patient  - Hold pressure on venipuncture sites to achieve adequate hemostasis  - Assess for signs and symptoms of internal bleeding  - Monitor lab trends  - Patient is to report abnormal signs of bleeding to staff  - Avoid use of toothpicks and dental floss  - Use electric shaver for shaving  - Use soft bristle tooth brush  - Limit straining and forceful nose blowing  Outcome: Progressing     Problem: PAIN - ADULT  Goal: Verbalizes/displays adequate comfort level or patient's stated pain goal  Description: INTERVENTIONS:  - Encourage pt to monitor pain and request assistance  - Assess pain using appropriate pain scale  - Administer analgesics based on type and severity of pain and evaluate response  - Implement non-pharmacological measures as appropriate and evaluate response  - Consider cultural and social influences on pain and pain management  - Manage/alleviate anxiety  - Utilize distraction and/or relaxation techniques  - Monitor for opioid side effects  - Notify MD/LIP if interventions unsuccessful or patient reports new pain  - Anticipate increased pain with activity and pre-medicate as appropriate  Outcome: Progressing     Problem: SAFETY ADULT - FALL  Goal: Free from fall injury  Description: INTERVENTIONS:  - Assess pt frequently for physical needs  - Identify cognitive and physical deficits and behaviors that affect risk of falls.  - [place] fall precautions as indicated by assessment.  - Educate pt/family on patient safety including physical limitations  - Instruct pt to call for assistance with activity based on assessment  - Modify environment to reduce risk of injury  - Provide assistive devices as appropriate  - Consider OT/PT consult to assist with strengthening/mobility  - Encourage toileting schedule  Outcome: Progressing

## 2024-03-21 ENCOUNTER — PATIENT OUTREACH (OUTPATIENT)
Dept: CASE MANAGEMENT | Age: 79
End: 2024-03-21

## 2024-03-21 NOTE — PROGRESS NOTES
Pt called back and LM to call her back.     LM for pt to call NCM for TCM since discharge. NCM phone number was provided for pt to call back.    TCC contact information was provided for pt to call back as well.

## 2024-03-21 NOTE — PROGRESS NOTES
LM for pt to call [facility] for TCM since discharge. NCM phone number was provided for pt to call back.    TCC contact information was provided for pt to call back as well.

## 2024-03-22 ENCOUNTER — PATIENT OUTREACH (OUTPATIENT)
Dept: CASE MANAGEMENT | Age: 79
End: 2024-03-22

## 2024-03-22 NOTE — PROGRESS NOTES
Hospital follow up.    TCC request.  [place].    Transferred to TCM nurse for further assistance.  Confirmed with patient.    Closing encounter.

## 2024-03-22 NOTE — PROGRESS NOTES
Initial Post Discharge Follow Up   Discharge Date: 3/20/24  Contact Date: 3/22/2024    Consent Verification:  Assessment Completed With: Patient  HIPAA Verified?  Yes    Discharge Dx:   New Afib with RVR  Acute COPD exacerbation secondary to influenza  Transaminitis  YAMILET    General:   How have you been since your discharge from the hospital? Pt feeling better, since hospital discharge--taking all new medications, as prescribed. Pt tolerating diet, staying hydrated, 98% O2 sat on room air, using IS, as directed. Pt reports some softer stool and nausea, since starting medications. Patient denies fever, chills, headache, vision changes, dizziness, vomiting, diarrhea, chest pain or shortness of breath at this time. Pt congested, but speaking in full clear sentences.  Do you have any pain since discharge?  No    How well was your pain managed while in the hospital?   On a scale of 1-5   1- Very Poor and 5- Very well   Very Well  When you were leaving the hospital were your discharge instructions reviewed with you? Yes  How well were your discharge instructions explained to you?   On a scale of 1-5   1- Very Poor and 5- Very well   Very Well  Do you have any questions about your discharge instructions?  No  Before leaving the hospital was your diagnoses explained to you? Yes  Do you have any questions about your diagnoses? No  Are you able to perform normal daily activities of living as you have prior to your hospital stay (dressing, bathing, ambulating to the bathroom, etc)? yes  (NCM) Was patient given a different diet per AVS? no    Medications:   Current Outpatient Medications   Medication Sig Dispense Refill    dilTIAZem  MG Oral Capsule SR 24 Hr Take 1 capsule (240 mg total) by mouth daily. 30 capsule 0    rivaroxaban 20 MG Oral Tab Take 1 tablet (20 mg total) by mouth nightly. 30 tablet 0    predniSONE 10 MG Oral Tab 40mg for 3 days, 30mg for 3 days, 20mg for 3 days, 10mg for 3 days 30 tablet 0    Tiotropium  Bromide Monohydrate 1.25 MCG/ACT Inhalation Aero Soln Inhale 1.25 mcg into the lungs daily. 1 each 0    famotidine 20 MG Oral Tab Take 1 tablet (20 mg total) by mouth 2 (two) times daily. 14 tablet 0    metoprolol tartrate 100 MG Oral Tab Take 1 tablet (100 mg total) by mouth 2x Daily(Beta Blocker). 180 tablet 0    gabapentin 300 MG Oral Cap Take 1 capsule (300 mg total) by mouth Noon.      gabapentin 600 MG Oral Tab Take 1 tablet (600 mg total) by mouth at bedtime.      pramipexole 0.125 MG Oral Tab Take 1 tablet (0.125 mg total) by mouth 5 (five) times daily.      levothyroxine 88 MCG Oral Tab Take 1 tablet (88 mcg total) by mouth before breakfast.      zafirlukast 20 MG Oral Tab Take 1 tablet (20 mg total) by mouth daily.       Were there any changes to your current medication(s) noted on the AVS? Yes  START taking:  amoxicillin clavulanate (Augmentin)  dilTIAZem ER (CardIZEM CD)  famotidine (Pepcid)  metoprolol tartrate (Lopressor)  predniSONE (Deltasone)  rivaroxaban (Xarelto)  CHANGE how you take:  Tiotropium Bromide Monohydrate  STOP taking:  buPROPion  MG Tb24 (Wellbutrin XL)  clonazePAM 0.5 MG Tabs (KlonoPIN)  colchicine 0.6 MG Tabs  ferrous sulfate 325 (65 FE) MG Tbec  fesoterodine ER 4 MG Tb24 (Toviaz)  hydroxychloroquine 200 MG Tabs (Plaquenil)  leflunomide 10 MG Tabs (Arava)  levETIRAcetam  MG Tb24 (KEPPRA XR)  methotrexate 2.5 MG Tabs (Rheumatrex)  Metoprolol Succinate  MG Tb24 (TOPROL-XL)  Myrbetriq 50 MG Tb24 (Mirabegron ER)  pantoprazole 40 MG Tbec (Protonix)  valsartan 160 MG Tabs (Diovan)  If so, were these medication changes discussed with you prior to leaving the hospital? Yes  If a new medication was prescribed:    Was the new medication's purpose & side effects reviewed? Yes  Do you have any questions about your new medication? No  Did you  your discharge medications when you left the hospital? Yes  Let's go over your medications together to make sure we are not  missing anything. Medications Reviewed  Are there any reasons that keep you from taking your medication as prescribed? No  Are you having any concerns with constipation? No  Did patient receive their flu shot (Sept-March)? No    Discharge medications reviewed/discussed/and reconciled against outpatient medications with patient.  Any changes or updates to medications sent to PCP.  Patient Acknowledged     Referrals/orders at D/C:  Referrals/orders placed at D/C? no  DME ordered at D/C? Yes  What? IS  From where? hospital  Have you received your (DME)? yes    Discharge orders, AVS reviewed and discussed with patient. Any changes or updates to orders sent to PCP.  Patient Acknowledged      SDOH:   Transportation:   Transportation Needs: No Transportation Needs (3/22/2024)    Transportation Needs     Lack of Transportation: No     Financial Strain:    Financial Resource Strain: Low Risk  (3/22/2024)    Financial Resource Strain     Difficulty of Paying Living Expenses: Not very hard     Med Affordability: No       Diagnosis specifics:   COPD: COPD:  Have you participated in a pulmonary rehab program?   no   Would you like more information?  no  We reviewed your medications/inhalers, how often are you using your inhaler? As prescribed  Are you currently on oxygen?no   Are you familiar with the signs and symptoms of worsening COPD?Yes       Who do you call with worsening COPD symptoms?her pulmonologist   Do you know when to call with COPD symptoms? Yes   Do you have any of the following potential risk factors for COPD in your home environment?  Primary or secondary tobacco smoke   no  Occupational dusts and chemicals organic and inorganic    no  Indoor air pollution from heating and cooking with poor ventilation   no    Mold      no    Pets        no    Extreme heat no    Other: n/a    Follow up appointments:    Start   Ordered   03/14/24 0954  Follow up with [facility] ([facility] only)  (Post-Discharge Follow-Up Orders)  Once,    Status:  Canceled        Comments: Visiting from [place]   Priority: Routine   Question: When should patient follow up? Answer: Within 2-4 days of discharge    03/14/24 0930         Follow-up Information      Follow up With Specialties Details Why Contact Info   Pcp, None  Schedule an appointment as soon as possible for a visit for post hospital follow up. To establish care with an [facility] primary care physician, call our Physician Referral line at [phone], Monday through Friday from 7 a.m. to 6 p.m. and Saturdays from 7 a.m. to 1 p.m. [facility] [id number] [place] [address]  Schedule an appointment as soon as possible for a visit in 2 week(s) or follow up with Cardiologist in [address] [address] [phone]   Pulmonologist in [place]  Schedule an appointment as soon as possible for a visit in 1 week          TCC  Was TCC ordered: Yes  Was TCC scheduled: No, Explain: pt on the road now, heading back to [place]    PCP (If no TCC appointment)  Does patient already have a PCP appointment scheduled? Yes--PCP in [place] next week    Specialist    Does the patient have any other follow up appointment(s) needing to be scheduled? No    Is there any reason as to why you cannot make your appointment(s)?  No     Needs post D/C:   Now that you are home, are there any needs or concerns you need addressed before your next visit with your PCP?  (DME, meds, questions, etc.): No    Interventions by [place]:   Discussed diet, activity, medications and need for f/u visits--pt will follow soft, bland diet d/t mild nausea and softer stool. Advised pt to call her pharmacist or providers in [place] as to what she can safely take OTC with her prescription medications, such as OTC probiotics and/or Immodium--patient verbalizes understanding and agreement. Pt continues to decline [facility] appt, as she is already on the road, heading home to [place]. Pt confirms f/u appt this Monday with her cardiologist in  [place] and also has f/u appt with her PCP and pulmonologist next week. Patient aware when to contact PCP/specialists and when to seek emergency care. No further questions/concerns at this time.    Overall Rating:   How would you rate the care you received while in the hospital? excellent    CCM referral placed:    No    BOOK BY DATE: 4/04/2024